# Patient Record
Sex: MALE | Race: BLACK OR AFRICAN AMERICAN | NOT HISPANIC OR LATINO | Employment: UNEMPLOYED | URBAN - METROPOLITAN AREA
[De-identification: names, ages, dates, MRNs, and addresses within clinical notes are randomized per-mention and may not be internally consistent; named-entity substitution may affect disease eponyms.]

---

## 2018-08-14 ENCOUNTER — OFFICE VISIT (OUTPATIENT)
Dept: FAMILY MEDICINE | Facility: CLINIC | Age: 43
End: 2018-08-14
Payer: COMMERCIAL

## 2018-08-14 VITALS
OXYGEN SATURATION: 98 % | WEIGHT: 265.4 LBS | DIASTOLIC BLOOD PRESSURE: 87 MMHG | SYSTOLIC BLOOD PRESSURE: 135 MMHG | TEMPERATURE: 98.5 F | BODY MASS INDEX: 36.76 KG/M2 | HEART RATE: 75 BPM | RESPIRATION RATE: 16 BRPM

## 2018-08-14 DIAGNOSIS — J02.9 VIRAL PHARYNGITIS: Primary | ICD-10-CM

## 2018-08-14 NOTE — PROGRESS NOTES
Preceptor attestation:  Vital signs reviewed: /87  Pulse 75  Temp 98.5  F (36.9  C) (Oral)  Resp 16  Wt 265 lb 6.4 oz (120.4 kg)  SpO2 98%  BMI 36.76 kg/m2    Patient seen, evaluated, and discussed with the resident.  I have verified the content of the note, which accurately reflects my assessment of the patient and the plan of care.    Supervising physician: Pamella Segovia MD  [facility]

## 2018-08-14 NOTE — PATIENT INSTRUCTIONS
To do list:  1) Use ibuprofen or tylenol as needed for discomfort    I expect that this will get better with time. If not improved within 2 weeks to 1 month, I would like you to come back to the clinic for recheck

## 2018-08-14 NOTE — PROGRESS NOTES
SUBJECTIVE       Carlito Dorantes is a 43 year old  male with a PMH significant for:     Patient Active Problem List   Diagnosis     Daytime somnolence     He presents with throat soreness for 1 month. Notices it the most with cough or sneeze. Has had quite a bit of sneezing over this period of time as well. Minimal cough. No throat itchiness, nose itchiness, eye itchiness. No nasal congestion. Denies fever/chills/sweats, abdominal pain, N/V or diarrhea. No rash on the body or itching skin. No history of allergies. No trouble swallowing.     PMH, Medications and Allergies were reviewed and updated as needed.        REVIEW OF SYSTEMS     See HPI        OBJECTIVE     Vitals:    08/14/18 1047 08/14/18 1049   BP: 142/87 135/87   BP Location: Left arm    Patient Position: Sitting    Cuff Size: Adult Large    Pulse: 75    Resp: 16    Temp: 98.5  F (36.9  C)    TempSrc: Oral    SpO2: 98%    Weight: 265 lb 6.4 oz (120.4 kg)      Body mass index is 36.76 kg/(m^2).    General appearance: Well-appearing adult male.  Alert, oriented and appropriate.  No acute distress  HEENT: Normocephalic, atraumatic.  No conjunctival injection or discharge, EOMI, PERRLA.  TMs appear normal bilaterally, no effusion present.  Nasal mucosa is normal without nasal discharge.  There is mild bilateral posterior oropharyngeal erythema.  There is bilateral tonsillar enlargement without exudate.  Neck: Supple.  Bilateral, tender submandibular and anterior cervical lymphadenopathy  Cardia vascular: Regular rate and rhythm, no rubs, murmurs or extra heart sounds  Pulmonary: Clear to auscultation bilaterally.  No wheezes, rales or rhonchi  No results found for this or any previous visit (from the past 24 hour(s)).        ASSESSMENT AND PLAN     1. Viral pharyngitis  Given the patient's history and exam, I suspect that his symptoms are secondary to a viral pharyngitis such as mononucleosis.  He has no other ongoing symptoms of URI, pneumonia or  seasonal allergies.  He has no swallowing difficulty.  He did have a negative HIV test while incarcerated recently.  He denies high-risk behaviors, and I do not feel that repeat HIV test is indicated today. Recommend ibuprofen or Tylenol as needed for discomfort.  Also encouraged plenty of rest in hydration.  Discussed that if symptoms are not improved/resolved within 2 weeks to 1 month, I would recommend that he return to clinic for a recheck.    Stefanie Allan

## 2018-08-14 NOTE — MR AVS SNAPSHOT
After Visit Summary   8/14/2018    Carlito Dorantes    MRN: [id number]           Patient Information     Date Of Birth          1975        Visit Information        Provider Department      8/14/2018 10:20 AM Stefanie Allan MD [facility]        Today's Diagnoses     Viral pharyngitis    -  1      Care Instructions    To do list:  1) Use ibuprofen or tylenol as needed for discomfort    I expect that this will get better with time. If not improved within 2 weeks to 1 month, I would like you to come back to the clinic for recheck          Follow-ups after your visit        Who to contact     Please call your clinic at [phone] to:    Ask questions about your health    Make or cancel appointments    Discuss your medicines    Learn about your test results    Speak to your doctor            Additional Information About Your Visit        Care EveryWhere ID     This is your Care EveryWhere ID. This could be used by other organizations to access your [place] medical records  [id number]        Your Vitals Were     Pulse Temperature Respirations Pulse Oximetry BMI (Body Mass Index)       75 98.5  F (36.9  C) (Oral) 16 98% 36.76 kg/m2        Blood Pressure from Last 3 Encounters:   08/14/18 135/87   12/07/15 139/85   11/16/15 155/77    Weight from Last 3 Encounters:   08/14/18 265 lb 6.4 oz (120.4 kg)   12/07/15 279 lb 9.6 oz (126.8 kg)   11/16/15 276 lb (125.2 kg)              Today, you had the following     No orders found for display         Today's Medication Changes          These changes are accurate as of 8/14/18 11:13 AM.  If you have any questions, ask your nurse or doctor.               These medicines have changed or have updated prescriptions.        Dose/Directions    amLODIPine 5 MG tablet   Commonly known as:  NORVASC   This may have changed:  Another medication with the same name was removed. Continue taking this medication, and follow the directions you see here.   Used for:   Essential hypertension   Changed by:  Stefanie Allan MD        Dose:  5 mg   Take 1 tablet (5 mg) by mouth daily   Quantity:  90 tablet   Refills:  3       vitamin D 2000 units tablet   This may have changed:  Another medication with the same name was removed. Continue taking this medication, and follow the directions you see here.   Used for:  Vitamin D deficiency   Changed by:  Stefanie Allan MD        Dose:  2000 Units   Take 2,000 Units by mouth daily   Quantity:  100 tablet   Refills:  3                Primary Care Provider Office Phone # Fax #    Lynette Nan Reyes [phone]       [address] [facility] [address]        Equal Access to Services     JOSE [facility] HospitalSIVAKUMAR : Hadii linwood beckero [place], waaxda luqadaha, qaybta kaalmada ademarcelo, ryne granados. So [facility] [phone].    ATENCIÓN: Si habla español, tiene a pizano disposición servicios gratuitos de asistencia lingüística. Llame al [phone].    We comply with applicable federal civil rights laws and [place] laws. We do not discriminate on the basis of race, color, national origin, age, disability, sex, sexual orientation, or gender identity.            Thank you!     Thank you for choosing [facility]  for your care. Our goal is always to provide you with excellent care. Hearing back from our patients is one way we can continue to improve our services. Please take a few minutes to complete the written survey that you may receive in the mail after your visit with us. Thank you!             Your Updated Medication List - Protect others around you: Learn how to safely use, store and throw away your medicines at www.disposemymeds.org.          This list is accurate as of 8/14/18 11:13 AM.  Always use your most recent med list.                   Brand Name Dispense Instructions for use Diagnosis    Adult Blood Pressure Cuff Lg Kit     1 kit    Blood pressure monitoring    Essential hypertension        amLODIPine 5 MG tablet    NORVASC    90 tablet    Take 1 tablet (5 mg) by mouth daily    Essential hypertension       lisinopril-hydrochlorothiazide 10-12.5 MG per tablet    PRINZIDE/ZESTORETIC    30 tablet    Take 1 tablet by mouth daily    Benign essential hypertension       multivitamin, therapeutic with minerals Tabs tablet      Take 1 tablet by mouth daily        simvastatin 20 MG tablet    ZOCOR    90 tablet    Take 1 tablet (20 mg) by mouth At Bedtime    Pure hypercholesterolemia       UNKNOWN TO PATIENT      Takes a blue blood pressure medication but uncertain what it is        vitamin D 2000 units tablet     100 tablet    Take 2,000 Units by mouth daily    Vitamin D deficiency

## 2018-09-20 ENCOUNTER — TELEPHONE (OUTPATIENT)
Dept: FAMILY MEDICINE | Facility: CLINIC | Age: 43
End: 2018-09-20

## 2018-09-20 ENCOUNTER — OFFICE VISIT (OUTPATIENT)
Dept: FAMILY MEDICINE | Facility: CLINIC | Age: 43
End: 2018-09-20
Payer: COMMERCIAL

## 2018-09-20 VITALS
RESPIRATION RATE: 24 BRPM | BODY MASS INDEX: 36.78 KG/M2 | DIASTOLIC BLOOD PRESSURE: 85 MMHG | WEIGHT: 265.6 LBS | TEMPERATURE: 97.8 F | SYSTOLIC BLOOD PRESSURE: 145 MMHG | HEART RATE: 79 BPM | OXYGEN SATURATION: 98 %

## 2018-09-20 DIAGNOSIS — E11.65 TYPE 2 DIABETES MELLITUS WITH HYPERGLYCEMIA, WITHOUT LONG-TERM CURRENT USE OF INSULIN (H): Primary | ICD-10-CM

## 2018-09-20 DIAGNOSIS — R10.9 FLANK PAIN: ICD-10-CM

## 2018-09-20 DIAGNOSIS — Z00.00 ROUTINE GENERAL MEDICAL EXAMINATION AT A HEALTH CARE FACILITY: ICD-10-CM

## 2018-09-20 DIAGNOSIS — L30.9 DERMATITIS: ICD-10-CM

## 2018-09-20 DIAGNOSIS — I10 BENIGN ESSENTIAL HYPERTENSION: ICD-10-CM

## 2018-09-20 LAB
BACTERIA: NORMAL
BILIRUBIN UR: ABNORMAL
BLOOD UR: NEGATIVE
BUN SERPL-MCNC: 14.7 MG/DL (ref 7–21)
CALCIUM SERPL-MCNC: 8.9 MG/DL (ref 8.5–10.1)
CASTS: NORMAL /LPF
CHLORIDE SERPLBLD-SCNC: 103.9 MMOL/L (ref 98–110)
CHOLEST SERPL-MCNC: 132.2 MG/DL (ref 0–200)
CHOLEST/HDLC SERPL: 4.1 {RATIO} (ref 0–5)
CO2 SERPL-SCNC: 24.6 MMOL/L (ref 20–32)
CREAT SERPL-MCNC: 0.9 MG/DL (ref 0.7–1.3)
CREAT UR-MCNC: 488.1 MG/DL
CRYSTAL URINE: NORMAL /LPF
EPITHELIAL CELLS UR: <2 /LPF (ref 0–2)
GFR SERPL CREATININE-BSD FRML MDRD: >90 ML/MIN/1.7 M2
GLUCOSE SERPL-MCNC: 100.5 MG'DL (ref 70–99)
GLUCOSE URINE: NEGATIVE
HBA1C MFR BLD: 5.1 % (ref 4.1–5.7)
HDLC SERPL-MCNC: 32 MG/DL
HIV 1+2 AB+HIV1 P24 AG SERPL QL IA: NEGATIVE
KETONES UR QL: NEGATIVE
LDLC SERPL CALC-MCNC: 78 MG/DL (ref 0–129)
LEUKOCYTE ESTERASE UR: ABNORMAL
MICROALBUMIN UR-MCNC: 2.03 MG/DL (ref 0–1.99)
MICROALBUMIN/CREAT UR: 4.2 MG/G
MUCOUS URINE: NORMAL LPF
NITRITE UR QL STRIP: NEGATIVE
PH UR STRIP: 5.5 [PH] (ref 5–7)
POTASSIUM SERPL-SCNC: 4.1 MMOL/DL (ref 3.2–4.6)
PROTEIN UR: ABNORMAL
RBC URINE: NORMAL /HPF
SODIUM SERPL-SCNC: 139.6 MMOL/L (ref 132–142)
SP GR UR STRIP: >=1.03
TRIGL SERPL-MCNC: 112.4 MG/DL (ref 0–150)
UROBILINOGEN UR STRIP-ACNC: ABNORMAL
VLDL CHOLESTEROL: 22.5 MG/DL (ref 7–32)
WBC URINE: NORMAL /HPF

## 2018-09-20 RX ORDER — CAPSAICIN 0.025 %
CREAM (GRAM) TOPICAL
Qty: 45 G | Refills: 1 | Status: SHIPPED | OUTPATIENT
Start: 2018-09-20 | End: 2023-10-23

## 2018-09-20 RX ORDER — NAPROXEN 500 MG/1
500 TABLET ORAL 2 TIMES DAILY PRN
Qty: 30 TABLET | Refills: 1 | Status: SHIPPED | OUTPATIENT
Start: 2018-09-20 | End: 2018-11-06

## 2018-09-20 RX ORDER — METFORMIN HCL 500 MG
500 TABLET, EXTENDED RELEASE 24 HR ORAL 2 TIMES DAILY
COMMUNITY
End: 2024-08-23

## 2018-09-20 RX ORDER — DESONIDE 0.5 MG/G
CREAM TOPICAL 2 TIMES DAILY
Qty: 15 G | Refills: 0 | Status: SHIPPED | OUTPATIENT
Start: 2018-09-20 | End: 2018-09-27

## 2018-09-20 NOTE — PROGRESS NOTES
This is a 43-year-old male not previously known to me.  He attends today with a 1 day history of discomfort in the right flank area.  He reports that the day previously he been helping his landlord put in a new door and so could have strained himself during that activity.  He otherwise is not working and does not do activities that physically exert him.  He denies any urinary frequency or dysuria.  He does go on to tell me that he did have urinary frequency when he was diagnosed with diabetes.  Indeed this is not included on his problem list and he has not had this addressed at a prior clinic visit.  With his permission care everywhere reveals that he was diagnosed with this in the ER approximately 9 months ago.  He tells me he has been going to [facility] clinic to  a prescription for metformin every month.  He has not had any blood work performed there and apparently has not seen providers there.  He attends [facility] pharmacy for his other medications.  He does not know why he is going to the [place] pharmacy just for Metformin.    He divulges that he was incarcerated for 2 years and was released a few months ago.  He knows that his HIV status was negative when in [facility].    He is attempting to adhere to both a diabetic and low-salt diet.  He finds difficulty losing weight.    Objective:  /85 (BP Location: Left arm, Patient Position: Sitting, Cuff Size: Adult Large)  Pulse 79  Temp 97.8  F (36.6  C) (Oral)  Resp 24  Wt 265 lb 9.6 oz (120.5 kg)  SpO2 98%  BMI 36.78 kg/m2  His systolic BP is elevated, he is obese.  He has no renal angle tenderness but instead describes discomfort that radiates along the level of the right 11th and 12th ribs to the mid axillary line.  There is no bony tenderness to palpation.  In addition he asks for an opinion concerning a skin lesion just lateral to approximately L1 on the left side.  It is not itchy and he thinks it has been there for a few months.  He is tried  moisturizer without effect.  On exam there is a collection of tiny skin lesions of uncertain etiology.    Carlito was seen today for pain, mass and medication reconciliation.    Diagnoses and all orders for this visit:    Type 2 diabetes mellitus with hyperglycemia, without long-term current use of insulin (H)  -     Microalbumin Creatinine Ratio Random Ur ([facility])  -     Basic Metabolic Panel ([place])  -     Hemoglobin A1c ([facility])  -     Lipid Panel ([facility])    Benign essential hypertension    Flank pain  -     Urinalysis, Micro If ([facility])  -     naproxen (NAPROSYN) 500 MG tablet; Take 1 tablet (500 mg) by mouth 2 times daily as needed for moderate pain  -     Discontinue: diclofenac (VOLTAREN) 1 % GEL topical gel; Apply  2 grams to right flank four times daily using enclosed dosing card.  -     Urine Microscopic (P )  -     capsaicin (ZOSTRIX) 0.025 % CREA cream; May apply up to 3 times daily to muscle strain  -     Urine Culture ([facility])    Routine general medical examination at a health care facility  -     HIV Ag/Ab Screen [place] ([facility])    Dermatitis  -     desonide (DESOWEN) 0.05 % cream; Apply topically 2 times daily for 7 days      I recommended that we check labs to assess his diabetic control.  I have added this to our problem list and will continue to track this.  I have also encouraged him to use a single pharmacy to reduce the risk of error.  He cannot tell me the dosing of his metformin and this needs to be updated at a future visit.    Otherwise by the time of dictation his urinalysis is come back not as clean as one would expect.  We will therefore check a urine culture and notify him of the results.  If urine culture is negative his current symptoms may reflect a muscle strain.  I given him both naproxen as well as topical capsaicin cream for symptomatic relief.    Concerning the atypical skin lesion on his back I have suggested trying a topical steroid for about 7 days.  If  this does not work I have asked him to return.  I would consider cryotherapy given that these may reflect molluscum or some other benign skin condition.  There is no features to suggest a more concerning etiology.    Total visit time was 25 mins, all of which was face to face MD time, and over 50% of this time was spent in counseling and coordination of care.

## 2018-09-20 NOTE — MR AVS SNAPSHOT
After Visit Summary   9/20/2018    Carlito Dorantes    MRN: [id number]           Patient Information     Date Of Birth          1975        Visit Information        Provider Department      9/20/2018 9:00 AM Orlando Esquivel MD [facility]        Today's Diagnoses     Type 2 diabetes mellitus with hyperglycemia, without long-term current use of insulin (H)    -  1    Benign essential hypertension        Flank pain        Routine general medical examination at a health care facility        Dermatitis           Follow-ups after your visit        Follow-up notes from your care team     Return in about 3 months (around 12/20/2018), or if symptoms worsen or fail to improve.      Who to contact     Please call your clinic at [phone] to:    Ask questions about your health    Make or cancel appointments    Discuss your medicines    Learn about your test results    Speak to your doctor            Additional Information About Your Visit        Care EveryWhere ID     This is your Care EveryWhere ID. This could be used by other organizations to access your [place] medical records  [id number]        Your Vitals Were     Pulse Temperature Respirations Pulse Oximetry BMI (Body Mass Index)       79 97.8  F (36.6  C) (Oral) 24 98% 36.78 kg/m2        Blood Pressure from Last 3 Encounters:   09/20/18 145/85   08/14/18 135/87   12/07/15 139/85    Weight from Last 3 Encounters:   09/20/18 265 lb 9.6 oz (120.5 kg)   08/14/18 265 lb 6.4 oz (120.4 kg)   12/07/15 279 lb 9.6 oz (126.8 kg)              We Performed the Following     Basic Metabolic Panel ([place])     Hemoglobin A1c ([facility])     HIV Ag/Ab Screen [place] ([facility])     Lipid Panel ([facility])     Microalbumin Creatinine Ratio Random Ur ([facility])     Urinalysis, Micro If ([facility])          Today's Medication Changes          These changes are accurate as of 9/20/18 10:03 AM.  If you have any questions, ask your nurse or doctor.               Start  taking these medicines.        Dose/Directions    desonide 0.05 % cream   Commonly known as:  DESOWEN   Used for:  Dermatitis   Started by:  Orlando Esquivel MD        Apply topically 2 times daily for 7 days   Quantity:  15 g   Refills:  0       diclofenac 1 % Gel topical gel   Commonly known as:  VOLTAREN   Used for:  Flank pain   Started by:  Orlando Esquivel MD        Apply  2 grams to right flank four times daily using enclosed dosing card.   Quantity:  100 g   Refills:  1       naproxen 500 MG tablet   Commonly known as:  NAPROSYN   Used for:  Flank pain   Started by:  Orlando Esquivel MD        Dose:  500 mg   Take 1 tablet (500 mg) by mouth 2 times daily as needed for moderate pain   Quantity:  30 tablet   Refills:  1            Where to get your medicines      These medications were sent to [facility] - [place], [place] - [address], [place] [address]     Phone:  [phone]     desonide 0.05 % cream    diclofenac 1 % Gel topical gel    naproxen 500 MG tablet                Primary Care Provider Office Phone # Fax #    Lynette Nan Reyes [phone]       [address] [place] [address]        Equal Access to Services     [facility] campusSIVAKUMAR : Hadii linwood hratmann hadasho [place], waaxda luqadaha, qaybta kaalmada adeegyada, ryne skelton . So [facility] [phone].    ATENCIÓN: Si habla español, tiene a pizano disposición servicios gratuitos de asistencia lingüística. LlSumma Health [phone].    We comply with applicable federal civil rights laws and [place] laws. We do not discriminate on the basis of race, color, national origin, age, disability, sex, sexual orientation, or gender identity.            Thank you!     Thank you for choosing [facility]  for your care. Our goal is always to provide you with excellent care. Hearing back from our patients is one way we can continue to improve our services. Please take a few minutes to complete the  written survey that you may receive in the mail after your visit with us. Thank you!             Your Updated Medication List - Protect others around you: Learn how to safely use, store and throw away your medicines at www.disposemymeds.org.          This list is accurate as of 9/20/18 10:03 AM.  Always use your most recent med list.                   Brand Name Dispense Instructions for use Diagnosis    Adult Blood Pressure Cuff Lg Kit     1 kit    Blood pressure monitoring    Essential hypertension       amLODIPine 5 MG tablet    NORVASC    90 tablet    Take 1 tablet (5 mg) by mouth daily    Essential hypertension       desonide 0.05 % cream    DESOWEN    15 g    Apply topically 2 times daily for 7 days    Dermatitis       diclofenac 1 % Gel topical gel    VOLTAREN    100 g    Apply  2 grams to right flank four times daily using enclosed dosing card.    Flank pain       lisinopril-hydrochlorothiazide 10-12.5 MG per tablet    PRINZIDE/ZESTORETIC    30 tablet    Take 1 tablet by mouth daily    Benign essential hypertension       multivitamin, therapeutic with minerals Tabs tablet      Take 1 tablet by mouth daily        naproxen 500 MG tablet    NAPROSYN    30 tablet    Take 1 tablet (500 mg) by mouth 2 times daily as needed for moderate pain    Flank pain       simvastatin 20 MG tablet    ZOCOR    90 tablet    Take 1 tablet (20 mg) by mouth At Bedtime    Pure hypercholesterolemia       UNKNOWN TO PATIENT      Takes a blue blood pressure medication but uncertain what it is        vitamin D 2000 units tablet     100 tablet    Take 2,000 Units by mouth daily    Vitamin D deficiency

## 2018-09-20 NOTE — LETTER
September 24, 2018      Carlito Dorantes  [address] [place] [address]        Dear Mikael Esteban - I hope you are feeling better by the time you receive this.  There is no infection in your urine so I think the most likely cause of your pain is a muscle strain - do please follow up if it's not getting better or if it's getting worse.     Your sugar is now back to the normal range!  This is quite a dramatic change from the very high sugars I saw when you were in the ER.  If you are eating better, it is possible that you could cut back on the dose of metformin - I think you can discuss this with your doctor at next visit in clinic.  Also your kidneys are working well and your bad cholesterol LDL level is low - which is good.   Please see below for your test results.    Resulted Orders   Microalbumin Creatinine Ratio Random Ur ([facility])   Result Value Ref Range    Microalbumin, Urine 2.03 (H) 0.00 - 1.99 mg/dL    Creatinine, Urine 488.1 mg/dL    Albumin Urine mg/g Cr 4.2 <=19.9 mg/g    Narrative    Test performed by:  [facility]  [address] [place] [address]  Microalbumin, Random Urine  <2.0 mg/dL . . . . . . . . Normal  3.0-30.0 mg/dL . . . . . . Microalbuminuria  >30.0 mg/dL . . . . . .  . Clinical Proteinuria  Microalbumin/Creatinine Ratio, Random Urine  <20 mg/g . . . . .. . . . Normal   mg/g . . . . . . . Microalbuminuria  >300 mg/g . . . . . . . . Clinical Proteinuria   Urinalysis, Micro If ([facility])   Result Value Ref Range    Specific Gravity Urine >=1.030 1.005 - 1.030    pH Urine 5.5 4.5 - 8.0    Leukocyte Esterase UR Trace (A) NEGATIVE    Nitrite Urine Negative NEGATIVE    Protein UR 1+ (A) NEGATIVE    Glucose Urine Negative NEGATIVE    Ketones Urine Negative NEGATIVE    Urobilinogen mg/dL 1.0 E.U./dL (A) 0.2 E.U./dL    Bilirubin UR 1+ (A) NEGATIVE    Blood UR Negative NEGATIVE   Basic Metabolic Panel ([place])   Result Value Ref Range    Urea Nitrogen 14.7  7.0 - 21.0 mg/dL    Calcium 8.9 8.5 - 10.1 mg/dL    Chloride 103.9 98.0 - 110.0 mmol/L    Carbon Dioxide 24.6 20.0 - 32.0 mmol/L    Creatinine 0.9 0.7 - 1.3 mg/dL    Glucose 100.5 (H) 70.0 - 99.0 mg'dL    Potassium 4.1 3.2 - 4.6 mmol/dL    Sodium 139.6 132.0 - 142.0 mmol/L    GFR Estimate >90 >60.0 mL/min/1.7 m2    GFR Estimate If Black >90 >60.0 mL/min/1.7 m2   Hemoglobin A1c ([facility])   Result Value Ref Range    Hemoglobin A1C 5.1 4.1 - 5.7 %   Lipid Panel ([facility])   Result Value Ref Range    Cholesterol 132.2 0.0 - 200.0 mg/dL    Cholesterol/HDL Ratio 4.1 0.0 - 5.0    HDL Cholesterol 32.0 (L) >40.0 mg/dL    LDL Cholesterol Calculated 78 0 - 129 mg/dL    Triglycerides 112.4 0.0 - 150.0 mg/dL    VLDL Cholesterol 22.5 7.0 - 32.0 mg/dL   HIV Ag/Ab Screen [place] ([facility])   Result Value Ref Range    HIV Antigen/Antibody Negative Negative    Narrative    Test performed by:  [facility] [address] [place] [address]  Method is Abbott HIV Ag/Ab for the detection of HIV p24 antigen, HIV-1   antibodies and HIV-2 antibodies.   Urine Microscopic ([facility])   Result Value Ref Range    WBC Urine 2-5 <5 /hpf    RBC Urine None <5 /hpf    Epithelial Cells UR <2 0 - 2 /lpf    Mucous Urine Few NONE lpf    Casts Urine None NONE /lpf    Crystal Urine None NONE /lpf    Bacteria Wet Prep Few None   Urine Culture ([facility])   Result Value Ref Range    Culture SEE RESULTS BELOW       Comment:      CULTURE, URINE   SOURCE: Urine, Random   CULTURE RESULTS:    No Growth      Narrative    Test performed by:  [facility] [address] [place] [address]       If you have any questions, please call the clinic to make an appointment.    Sincerely,    Orlando Esquivel MD

## 2018-09-20 NOTE — TELEPHONE ENCOUNTER
Medication change request.     Please advise on change, see pharmacy note:   Diclofenac Sodium 1% gel not covered. Please send for Capsaicin 0.025%.     Neli Logan, CMA

## 2018-09-21 LAB — CULTURE: NORMAL

## 2018-09-21 NOTE — PROGRESS NOTES
Mikael Dorantes - I hope you are feeling better by the time you receive this.  There is no infection in your urine so I think the most likely cause of your pain is a muscle strain - do please follow up if it's not getting better or if it's getting worse.    Your sugar is now back to the normal range!  This is quite a dramatic change from the very high sugars I saw when you were in the ER.  If you are eating better, it is possible that you could cut back on the dose of metformin - I think you can discuss this with your doctor at next visit in clinic.  Also your kidneys are working well and your bad cholesterol LDL level is low - which is good.     Take care, Dr Orlando Esquivel

## 2018-10-30 ENCOUNTER — TELEPHONE (OUTPATIENT)
Dept: FAMILY MEDICINE | Facility: CLINIC | Age: 43
End: 2018-10-30

## 2018-10-30 NOTE — TELEPHONE ENCOUNTER
[facility] phone call message- general phone call:    Reason for call: His sleep apna machine is not working any more and he needs a new one.Please give a call back re getting a new prescription.        Return call needed: Yes    OK to leave a message on voice mail? Yes    Primary language: English      needed? No    Call taken on October 30, 2018 at 8:53 AM by Rolo Zeng

## 2018-10-31 ENCOUNTER — TELEPHONE (OUTPATIENT)
Dept: FAMILY MEDICINE | Facility: CLINIC | Age: 43
End: 2018-10-31

## 2018-10-31 DIAGNOSIS — G47.33 OSA (OBSTRUCTIVE SLEEP APNEA): Primary | ICD-10-CM

## 2018-10-31 NOTE — TELEPHONE ENCOUNTER
Ordered new machine. Has to be a printed DME. Have patient  from clinic and take to medical supply store - should use whatever setting were prescribed by the physician who did his sleep study. Thanks!

## 2018-10-31 NOTE — TELEPHONE ENCOUNTER
Please advise, patient call asking for replacement of sleep apnea machine current machine is not in working condition.     Neli Logan, CMA

## 2018-11-06 ENCOUNTER — OFFICE VISIT (OUTPATIENT)
Dept: FAMILY MEDICINE | Facility: CLINIC | Age: 43
End: 2018-11-06
Payer: COMMERCIAL

## 2018-11-06 VITALS
OXYGEN SATURATION: 96 % | BODY MASS INDEX: 38.22 KG/M2 | RESPIRATION RATE: 14 BRPM | TEMPERATURE: 98.4 F | DIASTOLIC BLOOD PRESSURE: 86 MMHG | HEART RATE: 79 BPM | WEIGHT: 276 LBS | SYSTOLIC BLOOD PRESSURE: 138 MMHG

## 2018-11-06 DIAGNOSIS — R10.9 FLANK PAIN: ICD-10-CM

## 2018-11-06 DIAGNOSIS — G47.33 OSA (OBSTRUCTIVE SLEEP APNEA): Primary | ICD-10-CM

## 2018-11-06 RX ORDER — NAPROXEN 500 MG/1
500 TABLET ORAL 2 TIMES DAILY PRN
Qty: 30 TABLET | Refills: 1 | Status: SHIPPED | OUTPATIENT
Start: 2018-11-06 | End: 2023-10-23

## 2018-11-06 NOTE — PROGRESS NOTES
"Mikel Dorantes is a 43 year old male with a history including hypertension, DM2, and sleep apnea who presents because his CPAP machine is broken.    It broke about 1.5 weeks ago - it shuts down after just a few seconds.  He estimates that it is about 5 years old, which is consistent with when he initially presented with daytime somnolence in August 2013.    Social: Non-smoker.    Objective   Vitals: /86  Pulse 79  Temp 98.4  F (36.9  C) (Oral)  Resp 14  Wt 276 lb (125.2 kg)  SpO2 96%  BMI 38.22 kg/m2  General: Pleasant. Middle-aged man. No distress.  HEENT: Neck circumference 17.75\".  Heart: Regular rate and rhythm. No murmurs, rubs, or gallops.  Lungs: Clear to auscultation bilaterally. No wheezes or crackles. Good air movement.    Assessment & Plan   Face-to-face visit for new CPAP machine and supplies.  -- Prescription written for new machine and supplies.    Face to Face Attestation and Initial Plan of Care     The face-to-face encounter occurred on 11/06/2018.     Face to Face encounter was with: Pamella Segovia MD.     Please provide brief clinical summary of reason for visit: Home CPAP machine broken, 5 years old.  Diagnosis of sleep apnea.    Other information to assist the home health agency in developing the initial Plan of Care:   I certify that services are/were furnished while this patient was under the care of a physician.  A plan of care has been established by a physician and is periodically reviewed by a physician. The patient requires home CPAP as outlined in the plan of care.    Return to clinic in 6 months for blood pressure follow-up.  "

## 2018-11-06 NOTE — MR AVS SNAPSHOT
After Visit Summary   11/6/2018    Carlito Dorantes    MRN: [id number]           Patient Information     Date Of Birth          1975        Visit Information        Provider Department      11/6/2018 2:30 PM Pamella Segovia MD [facility]        Today's Diagnoses     PREETI (obstructive sleep apnea)    -  1      Care Instructions    Lifestyle Modifications to Manage Hypertension    Weight reduction to a body mass index of 18.5 to 24.9 will give rise to a systolic blood pressure reduction of  5 to 20 mm Hg.    Body mass index is 38.22 kg/(m^2).    Incorporating the DASH Diet (a diet rich in fruits, vegetables, and low-fat dairy products with a reduced content of saturated and total fat) is as effective as a single drug agent.  This will provide a reduction of  8 to 14 mm Hg    Dietary sodium restriction to no more than 100 mmol per day (2.4 g sodium or 6 g sodium chloride) will provide a reduction of  2 to 8 mm Hg.    Engage in regular aerobic activity such as brisk walking (at least 30 minutes per day, most days of the week). This will provide a reduction of  4 to 9 mm Hg.    Limit alcohol consumption to no more than two drinks (1 oz or 30 mL of alcohol; e.g., 24-oz beer, 10 oz of wine, or 3 oz of 80-proof whiskey) per day in most men and to no more than one drink per day in women and lighter-weight persons. This will provide a reduction of  2 to 4 mm Hg.    If continues to be elevated, we should consider using a low dose of a medication to improve this and lower your risk for heart attack and stroke.    ======================    DASH diet: Healthy eating to lower your blood pressure    [email or web address]/healthy-lifestyle/nutrition-and-healthy-eating/in-depth/dash-diet/art-[email or web address]    The DASH diet emphasizes portion size, eating a variety of foods and getting the right amount of nutrients. Discover how DASH can improve your health and lower your blood pressure.    By [facility]  Staff  DASH stands for Dietary Approaches to Stop Hypertension. The DASH diet is a lifelong approach to healthy eating that's designed to help treat or prevent high blood pressure (hypertension). The DASH diet encourages you to reduce the sodium in your diet and eat a variety of foods rich in nutrients that help lower blood pressure, such as potassium, calcium and magnesium.    By following the DASH diet, you may be able to reduce your blood pressure by a few points in just two weeks. Over time, your systolic blood pressure could drop by eight to 14 points, which can make a significant difference in your health risks.    Because the DASH diet is a healthy way of eating, it offers health benefits besides just lowering blood pressure. The DASH diet is also in line with dietary recommendations to prevent osteoporosis, cancer, heart disease, stroke and diabetes.    DASH diet: Sodium levels  The DASH diet emphasizes vegetables, fruits and low-fat dairy foods -- and moderate amounts of whole grains, fish, poultry and nuts.    In addition to the standard DASH diet, there is also a lower sodium version of the diet. You can choose the version of the diet that meets your health needs:    Standard DASH diet. You can consume up to 2,300 milligrams (mg) of sodium a day.  Lower sodium DASH diet. You can consume up to 1,500 mg of sodium a day.  Both versions of the DASH diet aim to reduce the amount of sodium in your diet compared with what you might get in a typical American diet, which can amount to a whopping 3,400 mg of sodium a day or more.    The standard DASH diet meets the recommendation from the Dietary Guidelines for Americans to keep daily sodium intake to less than 2,300 mg a day.    The American Heart Association recommends 1,500 mg a day of sodium as an upper limit for all adults. If you aren't sure what sodium level is right for you, talk to your doctor.    DASH diet: What to eat  Both versions of the DASH diet  "include lots of whole grains, fruits, vegetables and low-fat dairy products. The DASH diet also includes some fish, poultry and legumes, and encourages a small amount of nuts and seeds a few times a week.    You can eat red meat, sweets and fats in small amounts. The DASH diet is low in saturated fat, cholesterol and total fat.    Here's a look at the recommended servings from each food group for the 2,627-hzbxcrk-b-day DASH diet.    Grains: 6 to 8 servings a day  Grains include bread, cereal, rice and pasta. Examples of one serving of grains include 1 slice whole-wheat bread, 1 ounce dry cereal, or 1/2 cup cooked cereal, rice or pasta.    Focus on whole grains because they have more fiber and nutrients than do refined grains. For instance, use brown rice instead of white rice, whole-wheat pasta instead of regular pasta and whole-grain bread instead of white bread. Look for products labeled \"100 percent whole grain\" or \"100 percent whole wheat.\"  Grains are naturally low in fat. Keep them this way by avoiding butter, cream and cheese sauces.  Vegetables: 4 to 5 servings a day  Tomatoes, carrots, broccoli, sweet potatoes, greens and other vegetables are full of fiber, vitamins, and such minerals as potassium and magnesium. Examples of one serving include 1 cup raw leafy green vegetables or 1/2 cup cut-up raw or cooked vegetables.    Don't think of vegetables only as side dishes -- a hearty blend of vegetables served over brown rice or whole-wheat noodles can serve as the main dish for a meal.  Fresh and frozen vegetables are both good choices. When buying frozen and canned vegetables, choose those labeled as low sodium or without added salt.  To increase the number of servings you fit in daily, be creative. In a stir-khoury, for instance, cut the amount of meat in half and double up on the vegetables.  Fruits: 4 to 5 servings a day  Many fruits need little preparation to become a healthy part of a meal or snack. Like " vegetables, they're packed with fiber, potassium and magnesium and are typically low in fat -- coconuts are an exception. Examples of one serving include one medium fruit, 1/2 cup fresh, frozen or canned fruit, or 4 ounces of juice.    Have a piece of fruit with meals and one as a snack, then round out your day with a dessert of fresh fruits topped with a dollop of low-fat yogurt.  Leave on edible peels whenever possible. The peels of apples, pears and most fruits with pits add interesting texture to recipes and contain healthy nutrients and fiber.  Remember that citrus fruits and juices, such as grapefruit, can interact with certain medications, so check with your doctor or pharmacist to see if they're OK for you.  If you choose canned fruit or juice, make sure no sugar is added.  Dairy: 2 to 3 servings a day  Milk, yogurt, cheese and other dairy products are major sources of calcium, vitamin D and protein. But the key is to make sure that you choose dairy products that are low fat or fat-free because otherwise they can be a major source of fat -- and most of it is saturated. Examples of one serving include 1 cup skim or 1 percent milk, 1 cup low fat yogurt, or 1 1/2 ounces part-skim cheese.    Low-fat or fat-free frozen yogurt can help you boost the amount of dairy products you eat while offering a sweet treat. Add fruit for a healthy twist.  If you have trouble digesting dairy products, choose lactose-free products or consider taking an over-the-counter product that contains the enzyme lactase, which can reduce or prevent the symptoms of lactose intolerance.  Go easy on regular and even fat-free cheeses because they are typically high in sodium.  Lean meat, poultry and fish: 6 servings or fewer a day  Meat can be a rich source of protein, B vitamins, iron and zinc. Choose lean varieties and aim for no more than 6 ounces a day. Cutting back on your meat portion will allow room for more vegetables.    Trim away  skin and fat from poultry and meat and then bake, broil, grill or roast instead of frying in fat.  Eat heart-healthy fish, such as salmon, herring and tuna. These types of fish are high in omega-3 fatty acids, which can help lower your total cholesterol.  Nuts, seeds and legumes: 4 to 5 servings a week  Almonds, sunflower seeds, kidney beans, peas, lentils and other foods in this family are good sources of magnesium, potassium and protein. They're also full of fiber and phytochemicals, which are plant compounds that may protect against some cancers and cardiovascular disease.    Serving sizes are small and are intended to be consumed only a few times a week because these foods are high in calories. Examples of one serving include 1/3 cup nuts, 2 tablespoons seeds, or 1/2 cup cooked beans or peas.    Nuts sometimes get a bad rap because of their fat content, but they contain healthy types of fat -- monounsaturated fat and omega-3 fatty acids. They're high in calories, however, so eat them in moderation. Try adding them to stir-fries, salads or cereals.  Soybean-based products, such as tofu and tempeh, can be a good alternative to meat because they contain all of the amino acids your body needs to make a complete protein, just like meat.  Fats and oils: 2 to 3 servings a day  Fat helps your body absorb essential vitamins and helps your body's immune system. But too much fat increases your risk of heart disease, diabetes and obesity. The DASH diet strives for a healthy balance by limiting total fat to less than 30 percent of daily calories from fat, with a focus on the healthier monounsaturated fats.    Examples of one serving include 1 teaspoon soft margarine, 1 tablespoon mayonnaise or 2 tablespoons salad dressing.    Saturated fat and trans fat are the main dietary culprits in increasing your risk of coronary artery disease. DASH helps keep your daily saturated fat to less than 6 percent of your total calories by  limiting use of meat, butter, cheese, whole milk, cream and eggs in your diet, along with foods made from lard, solid shortenings, and palm and coconut oils.  Avoid trans fat, commonly found in such processed foods as crackers, baked goods and fried items.  Read food labels on margarine and salad dressing so that you can choose those that are lowest in saturated fat and free of trans fat.  Sweets: 5 servings or fewer a week  You don't have to banish sweets entirely while following the DASH diet -- just go easy on them. Examples of one serving include 1 tablespoon sugar, jelly or jam, 1/2 cup sorbet, or 1 cup lemonade.    When you eat sweets, choose those that are fat-free or low-fat, such as sorbets, fruit ices, jelly beans, hard candy, yandy crackers or low-fat cookies.  Artificial sweeteners such as aspartame (NutraSweet, Equal) and sucralose (Splenda) may help satisfy your sweet tooth while sparing the sugar. But remember that you still must use them sensibly. It's OK to swap a diet cola for a regular cola, but not in place of a more nutritious beverage such as low-fat milk or even plain water.  Cut back on added sugar, which has no nutritional value but can pack on calories.  DASH diet: Alcohol and caffeine  Drinking too much alcohol can increase blood pressure. The Dietary Guidelines for Americans recommends that men limit alcohol to no more than two drinks a day and women to one or less.    The DASH diet doesn't address caffeine consumption. The influence of caffeine on blood pressure remains unclear. But caffeine can cause your blood pressure to rise at least temporarily. If you already have high blood pressure or if you think caffeine is affecting your blood pressure, talk to your doctor about your caffeine consumption.    DASH diet and weight loss  While the DASH diet is not a weight-loss program, you may indeed lose unwanted pounds because it can help guide you toward healthier food choices.    The DASH  "diet generally includes about 2,000 calories a day. If you're trying to lose weight, you may need to eat fewer calories. You may also need to adjust your serving goals based on your individual circumstances -- something your health care team can help you decide.    Tips to cut back on sodium  The foods at the core of the DASH diet are naturally low in sodium. So just by following the DASH diet, you're likely to reduce your sodium intake. You also reduce sodium further by:    Using sodium-free spices or flavorings with your food instead of salt  Not adding salt when cooking rice, pasta or hot cereal  Rinsing canned foods to remove some of the sodium  Buying foods labeled \"no salt added,\" \"sodium-free,\" \"low sodium\" or \"very low sodium\"  One teaspoon of table salt has 2,325 mg of sodium. When you read food labels, you may be surprised at just how much sodium some processed foods contain. Even low-fat soups, canned vegetables, ready-to-eat cereals and sliced turkey from the local deli -- foods you may have considered healthy -- often have lots of sodium.    You may notice a difference in taste when you choose low-sodium food and beverages. If things seem too bland, gradually introduce low-sodium foods and cut back on table salt until you reach your sodium goal. That'll give your palate time to adjust.    Using salt-free seasoning blends or herbs and spices may also ease the transition. It can take several weeks for your taste buds to get used to less salty foods.    Putting the pieces of the DASH diet together  Try these strategies to get started on the DASH diet:    Change gradually. If you now eat only one or two servings of fruits or vegetables a day, try to add a serving at lunch and one at dinner. Rather than switching to all whole grains, start by making one or two of your grain servings whole grains. Increasing fruits, vegetables and whole grains gradually can also help prevent bloating or diarrhea that may " occur if you aren't used to eating a diet with lots of fiber. You can also try over-the-counter products to help reduce gas from beans and vegetables.  Reward successes and forgive slip-ups. Reward yourself with a nonfood treat for your accomplishments -- rent a movie, purchase a book or get together with a friend. Everyone slips, especially when learning something new. Remember that changing your lifestyle is a long-term process. Find out what triggered your setback and then just  where you left off with the DASH diet.  Add physical activity. To boost your blood pressure lowering efforts even more, consider increasing your physical activity in addition to following the DASH diet. Combining both the DASH diet and physical activity makes it more likely that you'll reduce your blood pressure.  Get support if you need it. If you're having trouble sticking to your diet, talk to your doctor or dietitian about it. You might get some tips that will help you stick to the DASH diet.  Remember, healthy eating isn't an all-or-nothing proposition. What's most important is that, on average, you eat healthier foods with plenty of variety -- both to keep your diet nutritious and to avoid boredom or extremes. And with the DASH diet, you can have both.          Follow-ups after your visit        Who to contact     Please call your clinic at [phone] to:    Ask questions about your health    Make or cancel appointments    Discuss your medicines    Learn about your test results    Speak to your doctor            Additional Information About Your Visit        Care EveryWhere ID     This is your Care EveryWhere ID. This could be used by other organizations to access your [facility] medical records  [id number]        Your Vitals Were     Pulse Temperature Respirations Pulse Oximetry BMI (Body Mass Index)       79 98.4  F (36.9  C) (Oral) 14 96% 38.22 kg/m2        Blood Pressure from Last 3 Encounters:   11/06/18 138/86    09/20/18 145/85   08/14/18 135/87    Weight from Last 3 Encounters:   11/06/18 276 lb (125.2 kg)   09/20/18 265 lb 9.6 oz (120.5 kg)   08/14/18 265 lb 6.4 oz (120.4 kg)              Today, you had the following     No orders found for display         Today's Medication Changes          These changes are accurate as of 11/6/18  3:00 PM.  If you have any questions, ask your nurse or doctor.               Start taking these medicines.        Dose/Directions    order for DME   Used for:  PREETI (obstructive sleep apnea)   Started by:  Pamella Segovia MD        Equipment being ordered: CPAP machine and supplies   Quantity:  1 each   Refills:  0            Where to get your medicines      These medications were sent to [facility] - [place], [place] - [address], [place] [address]     Phone:  [phone]     naproxen 500 MG tablet         Some of these will need a paper prescription and others can be bought over the counter.  Ask your nurse if you have questions.     Bring a paper prescription for each of these medications     order for DME                Primary Care Provider Office Phone # Fax #    Lynette Nan Reyes [phone]       [address] [facility] [address]        Equal Access to Services     LUCA LLOYD AH: Austin beckero Soomaali, waaxda luqadaha, qaybta kaalmada adeegyada, ryne granados. So [facility] [phone].    ATENCIÓN: Si habla español, tiene a pizano disposición servicios gratuitos de asistencia lingüística. Llame al [phone].    We comply with applicable federal civil rights laws and [place] laws. We do not discriminate on the basis of race, color, national origin, age, disability, sex, sexual orientation, or gender identity.            Thank you!     Thank you for choosing [facility]  for your care. Our goal is always to provide you with excellent care. Hearing back from our patients is one way we can  continue to improve our services. Please take a few minutes to complete the written survey that you may receive in the mail after your visit with us. Thank you!             Your Updated Medication List - Protect others around you: Learn how to safely use, store and throw away your medicines at www.disposemymeds.org.          This list is accurate as of 11/6/18  3:00 PM.  Always use your most recent med list.                   Brand Name Dispense Instructions for use Diagnosis    Adult Blood Pressure Cuff Lg Kit     1 kit    Blood pressure monitoring    Essential hypertension       amLODIPine 5 MG tablet    NORVASC    90 tablet    Take 1 tablet (5 mg) by mouth daily    Essential hypertension       capsaicin 0.025 % Crea cream    ZOSTRIX    45 g    May apply up to 3 times daily to muscle strain    Flank pain       lisinopril-hydrochlorothiazide 10-12.5 MG per tablet    PRINZIDE/ZESTORETIC    30 tablet    Take 1 tablet by mouth daily    Benign essential hypertension       metFORMIN 500 MG 24 hr tablet    GLUCOPHAGE-XR     Take 500 mg by mouth 2 times daily        multivitamin, therapeutic with minerals Tabs tablet      Take 1 tablet by mouth daily        naproxen 500 MG tablet    NAPROSYN    30 tablet    Take 1 tablet (500 mg) by mouth 2 times daily as needed for moderate pain    Flank pain       order for DME     1 Units    Equipment being ordered: CPAP    PREETI (obstructive sleep apnea)       order for DME     1 each    Equipment being ordered: CPAP machine and supplies    PREETI (obstructive sleep apnea)       simvastatin 20 MG tablet    ZOCOR    90 tablet    Take 1 tablet (20 mg) by mouth At Bedtime    Pure hypercholesterolemia       vitamin D 2000 units tablet     100 tablet    Take 2,000 Units by mouth daily    Vitamin D deficiency

## 2018-11-06 NOTE — PATIENT INSTRUCTIONS
Lifestyle Modifications to Manage Hypertension    Weight reduction to a body mass index of 18.5 to 24.9 will give rise to a systolic blood pressure reduction of  5 to 20 mm Hg.    Body mass index is 38.22 kg/(m^2).    Incorporating the DASH Diet (a diet rich in fruits, vegetables, and low-fat dairy products with a reduced content of saturated and total fat) is as effective as a single drug agent.  This will provide a reduction of  8 to 14 mm Hg    Dietary sodium restriction to no more than 100 mmol per day (2.4 g sodium or 6 g sodium chloride) will provide a reduction of  2 to 8 mm Hg.    Engage in regular aerobic activity such as brisk walking (at least 30 minutes per day, most days of the week). This will provide a reduction of  4 to 9 mm Hg.    Limit alcohol consumption to no more than two drinks (1 oz or 30 mL of alcohol; e.g., 24-oz beer, 10 oz of wine, or 3 oz of 80-proof whiskey) per day in most men and to no more than one drink per day in women and lighter-weight persons. This will provide a reduction of  2 to 4 mm Hg.    If continues to be elevated, we should consider using a low dose of a medication to improve this and lower your risk for heart attack and stroke.    ======================    DASH diet: Healthy eating to lower your blood pressure    [email or web address]/healthy-lifestyle/nutrition-and-healthy-eating/in-depth/dash-diet/art-12647470    The DASH diet emphasizes portion size, eating a variety of foods and getting the right amount of nutrients. Discover how DASH can improve your health and lower your blood pressure.    By [facility] Staff  DASH stands for Dietary Approaches to Stop Hypertension. The DASH diet is a lifelong approach to healthy eating that's designed to help treat or prevent high blood pressure (hypertension). The DASH diet encourages you to reduce the sodium in your diet and eat a variety of foods rich in nutrients that help lower blood pressure, such as potassium, calcium  and magnesium.    By following the DASH diet, you may be able to reduce your blood pressure by a few points in just two weeks. Over time, your systolic blood pressure could drop by eight to 14 points, which can make a significant difference in your health risks.    Because the DASH diet is a healthy way of eating, it offers health benefits besides just lowering blood pressure. The DASH diet is also in line with dietary recommendations to prevent osteoporosis, cancer, heart disease, stroke and diabetes.    DASH diet: Sodium levels  The DASH diet emphasizes vegetables, fruits and low-fat dairy foods -- and moderate amounts of whole grains, fish, poultry and nuts.    In addition to the standard DASH diet, there is also a lower sodium version of the diet. You can choose the version of the diet that meets your health needs:    Standard DASH diet. You can consume up to 2,300 milligrams (mg) of sodium a day.  Lower sodium DASH diet. You can consume up to 1,500 mg of sodium a day.  Both versions of the DASH diet aim to reduce the amount of sodium in your diet compared with what you might get in a typical American diet, which can amount to a whopping 3,400 mg of sodium a day or more.    The standard DASH diet meets the recommendation from the Dietary Guidelines for Americans to keep daily sodium intake to less than 2,300 mg a day.    The American Heart Association recommends 1,500 mg a day of sodium as an upper limit for all adults. If you aren't sure what sodium level is right for you, talk to your doctor.    DASH diet: What to eat  Both versions of the DASH diet include lots of whole grains, fruits, vegetables and low-fat dairy products. The DASH diet also includes some fish, poultry and legumes, and encourages a small amount of nuts and seeds a few times a week.    You can eat red meat, sweets and fats in small amounts. The DASH diet is low in saturated fat, cholesterol and total fat.    Here's a look at the recommended  "servings from each food group for the 2,830-ifgakth-k-day DASH diet.    Grains: 6 to 8 servings a day  Grains include bread, cereal, rice and pasta. Examples of one serving of grains include 1 slice whole-wheat bread, 1 ounce dry cereal, or 1/2 cup cooked cereal, rice or pasta.    Focus on whole grains because they have more fiber and nutrients than do refined grains. For instance, use brown rice instead of white rice, whole-wheat pasta instead of regular pasta and whole-grain bread instead of white bread. Look for products labeled \"100 percent whole grain\" or \"100 percent whole wheat.\"  Grains are naturally low in fat. Keep them this way by avoiding butter, cream and cheese sauces.  Vegetables: 4 to 5 servings a day  Tomatoes, carrots, broccoli, sweet potatoes, greens and other vegetables are full of fiber, vitamins, and such minerals as potassium and magnesium. Examples of one serving include 1 cup raw leafy green vegetables or 1/2 cup cut-up raw or cooked vegetables.    Don't think of vegetables only as side dishes -- a hearty blend of vegetables served over brown rice or whole-wheat noodles can serve as the main dish for a meal.  Fresh and frozen vegetables are both good choices. When buying frozen and canned vegetables, choose those labeled as low sodium or without added salt.  To increase the number of servings you fit in daily, be creative. In a stir-khoury, for instance, cut the amount of meat in half and double up on the vegetables.  Fruits: 4 to 5 servings a day  Many fruits need little preparation to become a healthy part of a meal or snack. Like vegetables, they're packed with fiber, potassium and magnesium and are typically low in fat -- coconuts are an exception. Examples of one serving include one medium fruit, 1/2 cup fresh, frozen or canned fruit, or 4 ounces of juice.    Have a piece of fruit with meals and one as a snack, then round out your day with a dessert of fresh fruits topped with a dollop of " low-fat yogurt.  Leave on edible peels whenever possible. The peels of apples, pears and most fruits with pits add interesting texture to recipes and contain healthy nutrients and fiber.  Remember that citrus fruits and juices, such as grapefruit, can interact with certain medications, so check with your doctor or pharmacist to see if they're OK for you.  If you choose canned fruit or juice, make sure no sugar is added.  Dairy: 2 to 3 servings a day  Milk, yogurt, cheese and other dairy products are major sources of calcium, vitamin D and protein. But the key is to make sure that you choose dairy products that are low fat or fat-free because otherwise they can be a major source of fat -- and most of it is saturated. Examples of one serving include 1 cup skim or 1 percent milk, 1 cup low fat yogurt, or 1 1/2 ounces part-skim cheese.    Low-fat or fat-free frozen yogurt can help you boost the amount of dairy products you eat while offering a sweet treat. Add fruit for a healthy twist.  If you have trouble digesting dairy products, choose lactose-free products or consider taking an over-the-counter product that contains the enzyme lactase, which can reduce or prevent the symptoms of lactose intolerance.  Go easy on regular and even fat-free cheeses because they are typically high in sodium.  Lean meat, poultry and fish: 6 servings or fewer a day  Meat can be a rich source of protein, B vitamins, iron and zinc. Choose lean varieties and aim for no more than 6 ounces a day. Cutting back on your meat portion will allow room for more vegetables.    Trim away skin and fat from poultry and meat and then bake, broil, grill or roast instead of frying in fat.  Eat heart-healthy fish, such as salmon, herring and tuna. These types of fish are high in omega-3 fatty acids, which can help lower your total cholesterol.  Nuts, seeds and legumes: 4 to 5 servings a week  Almonds, sunflower seeds, kidney beans, peas, lentils and other  foods in this family are good sources of magnesium, potassium and protein. They're also full of fiber and phytochemicals, which are plant compounds that may protect against some cancers and cardiovascular disease.    Serving sizes are small and are intended to be consumed only a few times a week because these foods are high in calories. Examples of one serving include 1/3 cup nuts, 2 tablespoons seeds, or 1/2 cup cooked beans or peas.    Nuts sometimes get a bad rap because of their fat content, but they contain healthy types of fat -- monounsaturated fat and omega-3 fatty acids. They're high in calories, however, so eat them in moderation. Try adding them to stir-fries, salads or cereals.  Soybean-based products, such as tofu and tempeh, can be a good alternative to meat because they contain all of the amino acids your body needs to make a complete protein, just like meat.  Fats and oils: 2 to 3 servings a day  Fat helps your body absorb essential vitamins and helps your body's immune system. But too much fat increases your risk of heart disease, diabetes and obesity. The DASH diet strives for a healthy balance by limiting total fat to less than 30 percent of daily calories from fat, with a focus on the healthier monounsaturated fats.    Examples of one serving include 1 teaspoon soft margarine, 1 tablespoon mayonnaise or 2 tablespoons salad dressing.    Saturated fat and trans fat are the main dietary culprits in increasing your risk of coronary artery disease. DASH helps keep your daily saturated fat to less than 6 percent of your total calories by limiting use of meat, butter, cheese, whole milk, cream and eggs in your diet, along with foods made from lard, solid shortenings, and palm and coconut oils.  Avoid trans fat, commonly found in such processed foods as crackers, baked goods and fried items.  Read food labels on margarine and salad dressing so that you can choose those that are lowest in saturated fat  and free of trans fat.  Sweets: 5 servings or fewer a week  You don't have to banish sweets entirely while following the DASH diet -- just go easy on them. Examples of one serving include 1 tablespoon sugar, jelly or jam, 1/2 cup sorbet, or 1 cup lemonade.    When you eat sweets, choose those that are fat-free or low-fat, such as sorbets, fruit ices, jelly beans, hard candy, yandy crackers or low-fat cookies.  Artificial sweeteners such as aspartame (NutraSweet, Equal) and sucralose (Splenda) may help satisfy your sweet tooth while sparing the sugar. But remember that you still must use them sensibly. It's OK to swap a diet cola for a regular cola, but not in place of a more nutritious beverage such as low-fat milk or even plain water.  Cut back on added sugar, which has no nutritional value but can pack on calories.  DASH diet: Alcohol and caffeine  Drinking too much alcohol can increase blood pressure. The Dietary Guidelines for Americans recommends that men limit alcohol to no more than two drinks a day and women to one or less.    The DASH diet doesn't address caffeine consumption. The influence of caffeine on blood pressure remains unclear. But caffeine can cause your blood pressure to rise at least temporarily. If you already have high blood pressure or if you think caffeine is affecting your blood pressure, talk to your doctor about your caffeine consumption.    DASH diet and weight loss  While the DASH diet is not a weight-loss program, you may indeed lose unwanted pounds because it can help guide you toward healthier food choices.    The DASH diet generally includes about 2,000 calories a day. If you're trying to lose weight, you may need to eat fewer calories. You may also need to adjust your serving goals based on your individual circumstances -- something your health care team can help you decide.    Tips to cut back on sodium  The foods at the core of the DASH diet are naturally low in sodium. So just  "by following the DASH diet, you're likely to reduce your sodium intake. You also reduce sodium further by:    Using sodium-free spices or flavorings with your food instead of salt  Not adding salt when cooking rice, pasta or hot cereal  Rinsing canned foods to remove some of the sodium  Buying foods labeled \"no salt added,\" \"sodium-free,\" \"low sodium\" or \"very low sodium\"  One teaspoon of table salt has 2,325 mg of sodium. When you read food labels, you may be surprised at just how much sodium some processed foods contain. Even low-fat soups, canned vegetables, ready-to-eat cereals and sliced turkey from the local deli -- foods you may have considered healthy -- often have lots of sodium.    You may notice a difference in taste when you choose low-sodium food and beverages. If things seem too bland, gradually introduce low-sodium foods and cut back on table salt until you reach your sodium goal. That'll give your palate time to adjust.    Using salt-free seasoning blends or herbs and spices may also ease the transition. It can take several weeks for your taste buds to get used to less salty foods.    Putting the pieces of the DASH diet together  Try these strategies to get started on the DASH diet:    Change gradually. If you now eat only one or two servings of fruits or vegetables a day, try to add a serving at lunch and one at dinner. Rather than switching to all whole grains, start by making one or two of your grain servings whole grains. Increasing fruits, vegetables and whole grains gradually can also help prevent bloating or diarrhea that may occur if you aren't used to eating a diet with lots of fiber. You can also try over-the-counter products to help reduce gas from beans and vegetables.  Reward successes and forgive slip-ups. Reward yourself with a nonfood treat for your accomplishments -- rent a movie, purchase a book or get together with a friend. Everyone slips, especially when learning something new. " Remember that changing your lifestyle is a long-term process. Find out what triggered your setback and then just  where you left off with the DASH diet.  Add physical activity. To boost your blood pressure lowering efforts even more, consider increasing your physical activity in addition to following the DASH diet. Combining both the DASH diet and physical activity makes it more likely that you'll reduce your blood pressure.  Get support if you need it. If you're having trouble sticking to your diet, talk to your doctor or dietitian about it. You might get some tips that will help you stick to the DASH diet.  Remember, healthy eating isn't an all-or-nothing proposition. What's most important is that, on average, you eat healthier foods with plenty of variety -- both to keep your diet nutritious and to avoid boredom or extremes. And with the DASH diet, you can have both.

## 2018-11-07 ENCOUNTER — MEDICAL CORRESPONDENCE (OUTPATIENT)
Dept: HEALTH INFORMATION MANAGEMENT | Facility: CLINIC | Age: 43
End: 2018-11-07

## 2018-12-17 ENCOUNTER — MEDICAL CORRESPONDENCE (OUTPATIENT)
Dept: HEALTH INFORMATION MANAGEMENT | Facility: CLINIC | Age: 43
End: 2018-12-17

## 2021-12-28 ENCOUNTER — OFFICE VISIT (OUTPATIENT)
Dept: FAMILY MEDICINE | Facility: CLINIC | Age: 46
End: 2021-12-28
Payer: COMMERCIAL

## 2021-12-28 VITALS
SYSTOLIC BLOOD PRESSURE: 154 MMHG | DIASTOLIC BLOOD PRESSURE: 102 MMHG | TEMPERATURE: 97.7 F | BODY MASS INDEX: 38.92 KG/M2 | WEIGHT: 281 LBS | RESPIRATION RATE: 16 BRPM | OXYGEN SATURATION: 96 % | HEART RATE: 83 BPM

## 2021-12-28 DIAGNOSIS — L25.3 CONTACT DERMATITIS DUE TO OTHER CHEMICAL PRODUCT, UNSPECIFIED CONTACT DERMATITIS TYPE: ICD-10-CM

## 2021-12-28 DIAGNOSIS — M77.11 LATERAL EPICONDYLITIS OF RIGHT ELBOW: ICD-10-CM

## 2021-12-28 DIAGNOSIS — Z23 NEED FOR PROPHYLACTIC VACCINATION AND INOCULATION AGAINST INFLUENZA: Primary | ICD-10-CM

## 2021-12-28 PROCEDURE — 90471 IMMUNIZATION ADMIN: CPT

## 2021-12-28 PROCEDURE — 90686 IIV4 VACC NO PRSV 0.5 ML IM: CPT

## 2021-12-28 PROCEDURE — 0001A COVID-19,PF,PFIZER (12+ YRS): CPT

## 2021-12-28 PROCEDURE — 99202 OFFICE O/P NEW SF 15 MIN: CPT | Mod: 25

## 2021-12-28 PROCEDURE — 91300 COVID-19,PF,PFIZER (12+ YRS): CPT

## 2021-12-28 RX ORDER — LOSARTAN POTASSIUM AND HYDROCHLOROTHIAZIDE 12.5; 1 MG/1; MG/1
1 TABLET ORAL DAILY
COMMUNITY
End: 2023-07-10

## 2021-12-28 RX ORDER — ATORVASTATIN CALCIUM 40 MG/1
40 TABLET, FILM COATED ORAL DAILY
COMMUNITY
End: 2023-10-02

## 2021-12-28 NOTE — PROGRESS NOTES
Preceptor Attestation:  I discussed the patient with the resident and evaluated the patient in person. I have verified the content of the note, which accurately reflects my assessment of the patient and the plan of care.  Supervising Physician:  Haylie Garcia MD.

## 2021-12-28 NOTE — PATIENT INSTRUCTIONS
Hand dermatitis:  Moisturizers I like: (thicker version (cream) is best)  Vanicream  Cetaphil  CeraVe  - Apply one of these lotions or creams to whole body daily and especially right after bathing  (apply twice daily in the winter)     Avoid anything that is scented      Exercises for Tennis Elbow:  [email or web address]/tennis-elbow-rehab#wrist-flexion    Fist clench  Poor  strength is a common symptom of tennis elbow. Improving  strength by building the muscles of the forearm can help improve ability to perform daily activities.    Equipment needed: table and towel    Muscles worked: long flexor tendons of the fingers and thumb    Sit at a table with your forearm resting on the table.  Hold a rolled up towel or small ball in your hand.  Squeeze the towel in your hand and hold for 10 seconds.  Release and repeat 10 times. Switch and do the other arm.    Supination with a dumbbell  The supinator muscle is a large muscle of the forearm that attaches into the elbow. It s responsible for turning the palm upward and is often involved in movements that can cause tennis elbow.    Equipment needed: table and 2-pound dumbbell    Muscles worked: supinator muscle    Sit in a chair holding a 2-pound dumbbell vertically in your hand with your elbow resting on your knee.  Let the weight of the dumbbell help rotate the arm outward, turning the palm up.  Rotate the hand back the other direction until your palm is facing downward.  Repeat 20 times on each side.  Try to isolate the movement to your lower arm, keeping your upper arm and elbow still.      Wrist extension  The wrist extensors are a group of muscles that are responsible for bending the wrist, like during the hand signal for stop. These small muscles that connect into the elbow are often subject to overuse, especially during racquet sports.    Equipment needed: table and 2-pound dumbbell    Muscles worked: wrist extensors    Sit in a  chair holding a 2-pound dumbbell in your hand with your palm facing down, resting your elbow comfortably on your knee.  Keeping your palm facing down, extend your wrist by curling it towards your body. If this is too challenging, do the movement with no weight.  Return to starting position and repeat 10 times on each side.  Try to isolate the movement to the wrist, keeping the rest of the arm still.    Wrist flexion  The wrist flexors are a group of muscles that work opposite the wrist extensors. These small muscles that connect into the elbow are also subject to overuse, leading to pain and inflammation.    Equipment needed: table and 2-pound dumbbell    Muscles worked: wrist flexors    Sit in a chair holding a 2-pound dumbbell in your hand with your palm facing up and elbow resting comfortably on your knee.  Keeping your palm facing up, flex your wrist by curling it towards your body.  Return to starting position and repeat 10 times on each side.  Try to isolate the movement to the wrist, keeping the rest of the arm still.

## 2021-12-28 NOTE — PROGRESS NOTES
"  Assessment & Plan   Lateral epicondylitis of right elbow  1 month of decreased supination, extension at elbow, and localized pain likely from increased training of others at work. Some improvement with rest and taking Aleve at home.   - Continue Aleve as needed  - Exercises provided (see AVS)    Need for prophylactic vaccination and inoculation against influenza  - INFLUENZA VACCINE IM > 6 MONTHS VALENT IIV4 (AFLURIA/FLUZONE)    Contact dermatitis due to other chemical product, unspecified contact dermatitis type  Months of a rash on hand, likely a contact dermatitis due to localized region and work with chemicals, though unspecified trigger. Patient is not bothered by dermatitis at this point, discussed options and patient would like to try moisturizing and can return to steroid treatment if desired  - Provided education and options for moisturizing (see AVS)          Return in about 1 month (around 1/28/2022) for diabetes and HTN.    Deb Meyer MD  [facility]   Carlito is a 46 year old who presents for the following health issues    -R elbow:  1 month of decreased supination and extension. Some improvement with rest and taking Aleve at home which helps.     -hand dermatitis  \"off and on\" rash on hand, not pruritic, no pain, not worsening. 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 Isn't bothered by the rash, but his partner was mentioning it to him so he wondered what it was. Has not used anything to treat it.     -COVID-19: No questions or concerns with the vaccine    -HTN: Discussed RTC to discuss further given elevation today         Review of Systems   As above      Objective    BP (!) 154/102 (BP Location: Left arm, Patient Position: Sitting, Cuff Size: Adult Large)   Pulse 83   Temp 97.7  F (36.5  C) (Oral)   Resp 16   Wt 127.5 kg (281 lb)   SpO2 96%   BMI 38.92 kg/m    Body mass index is 38.92 " kg/m .  Physical Exam  Constitutional:       General: He is not in acute distress.     Appearance: Normal appearance.   Eyes:      Extraocular Movements: Extraocular movements intact.      Conjunctiva/sclera: Conjunctivae normal.   Pulmonary:      Effort: Pulmonary effort is normal. No respiratory distress.   Musculoskeletal:      Comments: R elbow pain with active supination and extension of elbow, localized pain to lateral epicondyle.   Skin:     General: Skin is warm and dry.      Comments: R hand with small, skin colored, scattered papules localized to distal area of dorsum of hand. Xerosis of hands    Neurological:      Mental Status: He is alert.   Psychiatric:         Mood and Affect: Mood normal.         Behavior: Behavior normal.

## 2022-02-01 NOTE — PROGRESS NOTES
Assessment & Plan     1. Benign essential hypertension  Blood pressure elevated today. Has been off antihypertnsives for some time. Previously on hydrochlorothiazide-losartan and also amlodipine. Will check CMP and start hydrochlorothiazide-losartan today. Could consider restarting amlodipine at next visit if not well-controlled. Would recheck BMP at next visit as well.   - Comprehensive metabolic panel; Future  - lisinopril-hydrochlorothiazide (ZESTORETIC) 10-12.5 MG tablet; Take 1 tablet by mouth daily  Dispense: 30 tablet; Refill: 1  - Comprehensive metabolic panel    2. Right elbow pain  Several weeks of non-specific right elbow pain without any known trauma. Previously diagnosed with lateral epicondylitis, but no specific lateral epicondyl pain on palpation today. Will get x-ray d/t the catching sensation he notes and will start with scheduled NSAID for 2 weeks. Also PT.   - XR Elbow Right G/E 3 Views; Future    3. Routine general medical examination at a health care facility  Due for lipid and A1C recheck. Scheduling CPE at next visit.   - Lipid Profile; Future  - Hemoglobin A1c; Future  - Hemoglobin A1c  - Lipid Profile    4. High priority for 2019-nCoV vaccine  - COVID-19,PF,PFIZER (12+ Yrs GRAY LABEL)      Ordering of each unique test  Prescription drug management  25 minutes spent on the date of the encounter doing chart review, history and exam, documentation and further activities per the note     Return in about 2 weeks (around 2/18/2022) for Follow up.    Kaye Palma MD PGY3  [facility] is a 46 year old who presents for the following health issues:    HPI   1. Blood pressure. Previously prescribed losartan-hydrochlorothiazide and also amlodipine but hasn't been on either of these for several months now. Doesn't check at home.  2. History of diabetes. Not taking metformin. Last A1C 5.1 2018.   3. COVID- second pfiezer shot. No know exposures or sx.    4. Elbow pain. About 1 month ago he woke up with severe pain. Felt like it was broken. Now getting stronger but pain is still there. No recollection of injury or trauma. No medications. He saw Dr. Meyer in December and was diagnosed with lateral epicondylitis. Doing some stretches. He sometimes feels like it gets caught when flexing. 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      Review of Systems   Constitutional, HEENT, cardiovascular, pulmonary, gi and gu systems are negative, except as otherwise noted.      Objective    BP (!) 155/102 (BP Location: Left arm, Patient Position: Sitting, Cuff Size: Adult Large)   Pulse 81   Temp 98.2  F (36.8  C) (Oral)   Resp 20   Wt 127 kg (280 lb)   SpO2 98%   BMI 38.78 kg/m    Body mass index is 38.78 kg/m .  Physical Exam   GENERAL: healthy, alert and no distress  NECK: no adenopathy, no asymmetry, masses, or scars and thyroid normal to palpation  RESP: lungs clear to auscultation - no rales, rhonchi or wheezes  CV: regular rate and rhythm, normal S1 S2, no S3 or S4, no murmur, click or rub, no peripheral edema and peripheral pulses strong  ABDOMEN: soft, nontender, no hepatosplenomegaly, no masses and bowel sounds normal  MS: RUE- normal strength and ROM; no specific tenderness to lateral or medial epicondyl; no obvious swelling; some generalized pain with any movement, especially flexion and pronation    No results found for this or any previous visit (from the past 24 hour(s)).

## 2022-02-04 ENCOUNTER — OFFICE VISIT (OUTPATIENT)
Dept: FAMILY MEDICINE | Facility: CLINIC | Age: 47
End: 2022-02-04
Payer: COMMERCIAL

## 2022-02-04 ENCOUNTER — ANCILLARY PROCEDURE (OUTPATIENT)
Dept: GENERAL RADIOLOGY | Facility: CLINIC | Age: 47
End: 2022-02-04
Attending: STUDENT IN AN ORGANIZED HEALTH CARE EDUCATION/TRAINING PROGRAM
Payer: COMMERCIAL

## 2022-02-04 VITALS
RESPIRATION RATE: 20 BRPM | WEIGHT: 280 LBS | DIASTOLIC BLOOD PRESSURE: 102 MMHG | SYSTOLIC BLOOD PRESSURE: 155 MMHG | OXYGEN SATURATION: 98 % | BODY MASS INDEX: 38.78 KG/M2 | TEMPERATURE: 98.2 F | HEART RATE: 81 BPM

## 2022-02-04 DIAGNOSIS — M25.521 RIGHT ELBOW PAIN: ICD-10-CM

## 2022-02-04 DIAGNOSIS — Z23 HIGH PRIORITY FOR 2019-NCOV VACCINE: ICD-10-CM

## 2022-02-04 DIAGNOSIS — I10 BENIGN ESSENTIAL HYPERTENSION: Primary | ICD-10-CM

## 2022-02-04 DIAGNOSIS — Z00.00 ROUTINE GENERAL MEDICAL EXAMINATION AT A HEALTH CARE FACILITY: ICD-10-CM

## 2022-02-04 LAB
ALBUMIN SERPL-MCNC: 4.3 G/DL (ref 3.5–5)
ALP SERPL-CCNC: 71 U/L (ref 45–120)
ALT SERPL W P-5'-P-CCNC: 47 U/L (ref 0–45)
ANION GAP SERPL CALCULATED.3IONS-SCNC: 11 MMOL/L (ref 5–18)
AST SERPL W P-5'-P-CCNC: 26 U/L (ref 0–40)
BILIRUB SERPL-MCNC: 0.7 MG/DL (ref 0–1)
BUN SERPL-MCNC: 13 MG/DL (ref 8–22)
CALCIUM SERPL-MCNC: 9.3 MG/DL (ref 8.5–10.5)
CHLORIDE BLD-SCNC: 106 MMOL/L (ref 98–107)
CHOLEST SERPL-MCNC: 138 MG/DL
CO2 SERPL-SCNC: 25 MMOL/L (ref 22–31)
CREAT SERPL-MCNC: 0.85 MG/DL (ref 0.7–1.3)
FASTING STATUS PATIENT QL REPORTED: ABNORMAL
GFR SERPL CREATININE-BSD FRML MDRD: >90 ML/MIN/1.73M2
GLUCOSE BLD-MCNC: 135 MG/DL (ref 70–125)
HBA1C MFR BLD: 6 % (ref 0–5.6)
HDLC SERPL-MCNC: 31 MG/DL
LDLC SERPL CALC-MCNC: 82 MG/DL
POTASSIUM BLD-SCNC: 4.3 MMOL/L (ref 3.5–5)
PROT SERPL-MCNC: 7.6 G/DL (ref 6–8)
SODIUM SERPL-SCNC: 142 MMOL/L (ref 136–145)
TRIGL SERPL-MCNC: 126 MG/DL

## 2022-02-04 PROCEDURE — 80061 LIPID PANEL: CPT | Performed by: STUDENT IN AN ORGANIZED HEALTH CARE EDUCATION/TRAINING PROGRAM

## 2022-02-04 PROCEDURE — 36415 COLL VENOUS BLD VENIPUNCTURE: CPT | Performed by: STUDENT IN AN ORGANIZED HEALTH CARE EDUCATION/TRAINING PROGRAM

## 2022-02-04 PROCEDURE — 83036 HEMOGLOBIN GLYCOSYLATED A1C: CPT | Performed by: STUDENT IN AN ORGANIZED HEALTH CARE EDUCATION/TRAINING PROGRAM

## 2022-02-04 PROCEDURE — 73080 X-RAY EXAM OF ELBOW: CPT | Mod: RT | Performed by: RADIOLOGY

## 2022-02-04 PROCEDURE — 0052A COVID-19,PF,PFIZER (12+ YRS): CPT | Performed by: STUDENT IN AN ORGANIZED HEALTH CARE EDUCATION/TRAINING PROGRAM

## 2022-02-04 PROCEDURE — 99214 OFFICE O/P EST MOD 30 MIN: CPT | Mod: 25 | Performed by: STUDENT IN AN ORGANIZED HEALTH CARE EDUCATION/TRAINING PROGRAM

## 2022-02-04 PROCEDURE — 80053 COMPREHEN METABOLIC PANEL: CPT | Performed by: STUDENT IN AN ORGANIZED HEALTH CARE EDUCATION/TRAINING PROGRAM

## 2022-02-04 PROCEDURE — 91305 COVID-19,PF,PFIZER (12+ YRS): CPT | Performed by: STUDENT IN AN ORGANIZED HEALTH CARE EDUCATION/TRAINING PROGRAM

## 2022-02-04 RX ORDER — LISINOPRIL/HYDROCHLOROTHIAZIDE 10-12.5 MG
1 TABLET ORAL DAILY
Qty: 30 TABLET | Refills: 1 | Status: SHIPPED | OUTPATIENT
Start: 2022-02-04 | End: 2023-07-10

## 2022-02-04 NOTE — PROGRESS NOTES
Preceptor Attestation:    I discussed the patient with the resident and evaluated the patient in person. I have verified the content of the note, which accurately reflects my assessment of the patient and the plan of care.   Supervising Physician:  Pb Hickman MD.

## 2023-07-10 ENCOUNTER — OFFICE VISIT (OUTPATIENT)
Dept: FAMILY MEDICINE | Facility: CLINIC | Age: 48
End: 2023-07-10
Payer: COMMERCIAL

## 2023-07-10 VITALS
HEIGHT: 72 IN | OXYGEN SATURATION: 97 % | TEMPERATURE: 97.9 F | BODY MASS INDEX: 30.61 KG/M2 | WEIGHT: 226 LBS | SYSTOLIC BLOOD PRESSURE: 161 MMHG | HEART RATE: 96 BPM | DIASTOLIC BLOOD PRESSURE: 107 MMHG | RESPIRATION RATE: 18 BRPM

## 2023-07-10 DIAGNOSIS — Z11.59 NEED FOR HEPATITIS C SCREENING TEST: ICD-10-CM

## 2023-07-10 DIAGNOSIS — Z00.00 ROUTINE GENERAL MEDICAL EXAMINATION AT A HEALTH CARE FACILITY: Primary | ICD-10-CM

## 2023-07-10 DIAGNOSIS — E11.65 TYPE 2 DIABETES MELLITUS WITH HYPERGLYCEMIA, WITHOUT LONG-TERM CURRENT USE OF INSULIN (H): ICD-10-CM

## 2023-07-10 DIAGNOSIS — I10 BENIGN ESSENTIAL HYPERTENSION: ICD-10-CM

## 2023-07-10 DIAGNOSIS — Z12.11 SCREEN FOR COLON CANCER: ICD-10-CM

## 2023-07-10 DIAGNOSIS — I10 ESSENTIAL HYPERTENSION: ICD-10-CM

## 2023-07-10 PROCEDURE — 99396 PREV VISIT EST AGE 40-64: CPT | Mod: GC

## 2023-07-10 RX ORDER — CALCIUM POLYCARBOPHIL 625 MG 625 MG/1
2 TABLET ORAL DAILY
Qty: 90 TABLET | Refills: 1 | Status: SHIPPED | OUTPATIENT
Start: 2023-07-10 | End: 2023-10-23

## 2023-07-10 RX ORDER — AMLODIPINE BESYLATE 5 MG/1
5 TABLET ORAL DAILY
Qty: 90 TABLET | Refills: 3 | Status: SHIPPED | OUTPATIENT
Start: 2023-07-10 | End: 2024-08-23

## 2023-07-10 RX ORDER — LISINOPRIL/HYDROCHLOROTHIAZIDE 10-12.5 MG
1 TABLET ORAL DAILY
Qty: 30 TABLET | Refills: 1 | Status: SHIPPED | OUTPATIENT
Start: 2023-07-10 | End: 2023-10-23

## 2023-07-10 RX ORDER — LOSARTAN POTASSIUM AND HYDROCHLOROTHIAZIDE 12.5; 1 MG/1; MG/1
1 TABLET ORAL DAILY
Qty: 30 TABLET | Refills: 1 | Status: SHIPPED | OUTPATIENT
Start: 2023-07-10 | End: 2023-07-10

## 2023-07-10 ASSESSMENT — ENCOUNTER SYMPTOMS
DIARRHEA: 0
CONSTIPATION: 0
COUGH: 0
HEMATURIA: 0
FREQUENCY: 0
ARTHRALGIAS: 0
CHILLS: 0
HEARTBURN: 0
NERVOUS/ANXIOUS: 1
EYE PAIN: 0
ABDOMINAL PAIN: 0
HEMATOCHEZIA: 0
DIZZINESS: 0

## 2023-07-10 NOTE — PROGRESS NOTES
"SUBJECTIVE:   CC: Carlito is an 48 year old who presents for preventative health visit.        No data to display              Healthy Habits:     Getting at least 3 servings of Calcium per day:  Yes    Bi-annual eye exam:  NO    Dental care twice a year:  NO    Sleep apnea or symptoms of sleep apnea:  Sleep apnea    Diet:  Low salt and Other    Frequency of exercise:  2-3 days/week    Duration of exercise:  Less than 15 minutes    Taking medications regularly:  Yes    Medication side effects:  None    Additional concerns today:  No          Annual Wellness Visit    Physical Health:    In general, how would you rate your overall physical health? good    Outside of work, how many days during the week do you exercise?2-3 days/week    Outside of work, approximately how many minutes a day do you exercise?15-30 minutes  If you drink alcohol do you typically have >3 drinks per day or >7 drinks per week? Yes - AUDIT SCORE:          No data to display                   Do you usually eat at least 4 servings of fruit and vegetables a day, include whole grains & fiber and avoid regularly eating high fat or \"junk\" foods? Yes    Do you have any problems taking medications regularly? YES    Do you have any side effects from medications? not applicable    Needs assistance for the following daily activities: no assistance needed    Which of the following safety concerns are present in your home?  none identified     Hearing impairment: No    In the past 6 months, have you been bothered by leaking of urine? Urinating more at night.    Mental Health:    In general, how would you rate your overall mental or emotional health? excellent  PHQ-2 Score: 1    Do you feel safe in your environment? Yes    Have you ever done Advance Care Planning? (For example, a Health Directive, POLST, or a discussion with a medical provider or your loved ones about your wishes)? No, advance care planning information given to patient to review.  Patient " plans to discuss their wishes with loved ones or provider.      Do you have sleep apnea, excessive snoring or daytime drowsiness?: yes    Social History     Tobacco Use     Smoking status: Every Day     Types: Other     Smokeless tobacco: Never   Substance Use Topics     Alcohol use: No             7/10/2023     9:15 AM   Alcohol Use   Prescreen: >3 drinks/day or >7 drinks/week? No     Do you have a current opioid prescription? No  Do you use any other controlled substances or medications that are not prescribed by a provider? Cannabis    Current providers sharing in care for this patient include:   Patient Care Team:  Jose Luis Shaffer MD as PCP - General (Student in organized health care education/training program)  Shantanu Washington MD as Assigned PCP    Urinary concerns  For several months now the patient has noticed decreased urinary flow and peeing frequently during the night.  Also drinking a lot of fluid during the day and being very thirsty.  Does not feel that he has to strain to urinate or that he is unable to empty his bladder.  Has had 1 or 2 occasions of erectile dysfunction.  Has been doing a lot of research at home about the prostate and is wanting to know more about that today.  No family history of prostate cancer.    Hypertension Follow-up      Do you check your blood pressure regularly outside of the clinic? No     Are you following a low salt diet? No    Are your blood pressures ever more than 140 on the top number (systolic) OR more   than 90 on the bottom number (diastolic), for example 140/90? Yes    Have you ever done Advance Care Planning? (For example, a Health Directive, POLST, or a discussion with a medical provider or your loved ones about your wishes): No, advance care planning information given to patient to review.  Patient plans to discuss their wishes with loved ones or provider.      Social History     Tobacco Use     Smoking status: Every Day     Types: Other     Smokeless  tobacco: Never   Substance Use Topics     Alcohol use: No       Last PSA: No results found for: PSA    Reviewed orders with patient. Reviewed health maintenance and updated orders accordingly - Yes  Lab work is in process  Patient Active Problem List   Diagnosis     Daytime somnolence     Type 2 diabetes mellitus with hyperglycemia, without long-term current use of insulin (H)     Benign essential hypertension     No past surgical history on file.    Social History     Tobacco Use     Smoking status: Every Day     Types: Other     Smokeless tobacco: Never   Substance Use Topics     Alcohol use: No     No family history on file.      Current Outpatient Medications   Medication Sig Dispense Refill     amLODIPine (NORVASC) 5 MG tablet Take 1 tablet (5 mg) by mouth daily 90 tablet 3     calcium polycarbophil (FIBERCON) 625 MG tablet Take 2 tablets (1,250 mg) by mouth daily 90 tablet 1     lisinopril-hydrochlorothiazide (ZESTORETIC) 10-12.5 MG tablet Take 1 tablet by mouth daily 30 tablet 1     atorvastatin (LIPITOR) 40 MG tablet Take 40 mg by mouth daily       Blood Pressure Monitoring (ADULT BLOOD PRESSURE CUFF LG) KIT Blood pressure monitoring 1 kit 0     capsaicin (ZOSTRIX) 0.025 % CREA cream May apply up to 3 times daily to muscle strain 45 g 1     Cholecalciferol (VITAMIN D) 2000 UNITS tablet Take 2,000 Units by mouth daily 100 tablet 3     metFORMIN (GLUCOPHAGE-XR) 500 MG 24 hr tablet Take 500 mg by mouth 2 times daily        multivitamin, therapeutic with minerals (THERA-VIT-M) TABS Take 1 tablet by mouth daily        naproxen (NAPROSYN) 500 MG tablet Take 1 tablet (500 mg) by mouth 2 times daily as needed for moderate pain 30 tablet 1     order for DME Equipment being ordered: CPAP machine and supplies 1 each 0     order for DME Equipment being ordered: CPAP 1 Units 0     simvastatin (ZOCOR) 20 MG tablet Take 1 tablet (20 mg) by mouth At Bedtime 90 tablet 3     Allergies   Allergen Reactions     Shellfish  Allergy      Had it as a child     Recent Labs   Lab Test 02/04/22  0939 09/20/18  1008 11/16/15  1437   A1C 6.0* 5.1  --    LDL 82 78  --    HDL 31* 32.0*  --    TRIG 126 112.4  --    ALT 47*  --  23.0   CR 0.85 0.9 0.9   GFRESTIMATED >90 >90 98.4   GFRESTBLACK  --  >90 119.0   POTASSIUM 4.3 4.1 3.6        Reviewed and updated as needed this visit by clinical staff   Tobacco  Allergies               Reviewed and updated as needed this visit by Provider    Review of Systems   Constitutional: Negative for chills.   HENT: Negative for congestion and ear pain.    Eyes: Negative for pain.   Respiratory: Negative for cough.    Cardiovascular: Negative for chest pain.   Gastrointestinal: Negative for abdominal pain, constipation, diarrhea, heartburn and hematochezia.   Genitourinary: Negative for frequency, hematuria and urgency.   Musculoskeletal: Negative for arthralgias.   Neurological: Negative for dizziness.   Psychiatric/Behavioral: Negative for mood changes. The patient is nervous/anxious.        OBJECTIVE:   BP (!) 161/107   Pulse 96   Temp 97.9  F (36.6  C) (Oral)   Resp 18   Ht 1.829 m (6')   Wt 102.5 kg (226 lb)   SpO2 97%   BMI 30.65 kg/m      Physical Exam  GENERAL: healthy, alert and no distress  EYES: Eyes grossly normal to inspection, PERRL and conjunctivae and sclerae normal  HENT: ear canals and TM's normal, nose and mouth without ulcers or lesions  NECK: no adenopathy, no asymmetry, masses, or scars and thyroid normal to palpation  RESP: lungs clear to auscultation - no rales, rhonchi or wheezes  CV: regular rate and rhythm, normal S1 S2, no S3 or S4, no murmur, click or rub, no peripheral edema   ABDOMEN: soft, nontender, no hepatosplenomegaly, no masses and bowel sounds normal  MS: no gross musculoskeletal defects noted, no edema  SKIN: no suspicious lesions or rashes  NEURO: Normal strength and tone, mentation intact and speech normal  PSYCH: mentation appears normal, affect  normal/bright    Diagnostic Test Results:  Labs reviewed in Epic  No results found for any visits on 07/10/23.    ASSESSMENT/PLAN:       ICD-10-CM    1. Routine general medical examination at a health care facility  Z00.00 Lipid panel reflex to direct LDL Fasting     Fecal colorectal cancer screen FIT     Comprehensive Metabolic Panel     Hemoglobin A1c     calcium polycarbophil (FIBERCON) 625 MG tablet     Lipid panel reflex to direct LDL Fasting     Comprehensive metabolic panel     CANCELED: Basic Metabolic Panel     CANCELED: Hemoglobin A1c     CANCELED: Comprehensive Metabolic Panel     CANCELED: Fecal colorectal cancer screen FIT     CANCELED: Lipid panel reflex to direct LDL Fasting      2. Type 2 diabetes mellitus with hyperglycemia, without long-term current use of insulin (H)  E11.65 Hemoglobin A1c     Adult Eye  Referral     Albumin Random Urine Quantitative with Creat Ratio     Hemoglobin A1c     Albumin Random Urine Quantitative with Creat Ratio     CANCELED: Albumin Random Urine Quantitative with Creat Ratio     CANCELED: Hemoglobin A1c      3. Screen for colon cancer  Z12.11 Fecal colorectal cancer screen FIT      4. Need for hepatitis C screening test  Z11.59       5. Benign essential hypertension  I10 lisinopril-hydrochlorothiazide (ZESTORETIC) 10-12.5 MG tablet     DISCONTINUED: losartan-hydrochlorothiazide (HYZAAR) 100-12.5 MG tablet      6. Essential hypertension  I10 amLODIPine (NORVASC) 5 MG tablet            COUNSELING:   Reviewed preventive health counseling, as reflected in patient instructions       Regular exercise       Healthy diet/nutrition       Alcohol Use        Colorectal cancer screening       Prostate cancer screening       The 10-year ASCVD risk score (Aj LEE, et al., 2019) is: 38.5%    Values used to calculate the score:      Age: 48 years      Sex: Male      Is Non- : Yes      Diabetic: Yes      Tobacco smoker: Yes      Systolic Blood  Pressure: 161 mmHg      Is BP treated: Yes      HDL Cholesterol: 31 mg/dL      Total Cholesterol: 138 mg/dL        He reports that he has been smoking other. He has never used smokeless tobacco.      Jose Luis Shaffer MD  [facility]

## 2023-07-10 NOTE — PATIENT INSTRUCTIONS
Preventive Health Recommendations  Male Ages 50 - 64    Yearly exam:             See your health care provider every year in order to  o   Review health changes.   o   Discuss preventive care.    o   Review your medicines if your doctor has prescribed any.   Have a cholesterol test every 5 years, or more frequently if you are at risk for high cholesterol/heart disease.   Have a diabetes test (fasting glucose) every three years. If you are at risk for diabetes, you should have this test more often.   Have a colonoscopy at age 50, or have a yearly FIT test (stool test). These exams will check for colon cancer.    Talk with your health care provider about whether or not a prostate cancer screening test (PSA) is right for you.  You should be tested each year for STDs (sexually transmitted diseases), if you re at risk.     Shots: Get a flu shot each year. Get a tetanus shot every 10 years.     Nutrition:  Eat at least 5 servings of fruits and vegetables daily.   Eat whole-grain bread, whole-wheat pasta and brown rice instead of white grains and rice.   Get adequate Calcium and Vitamin D.     Blood pressure goals  120/80    Lifestyle  Exercise for at least 150 minutes a week (30 minutes a day, 5 days a week). This will help you control your weight and prevent disease.   Limit alcohol to one drink per day.   No smoking.   Wear sunscreen to prevent skin cancer.   See your dentist every six months for an exam and cleaning.   See your eye doctor every 1 to 2 years.

## 2023-07-10 NOTE — PROGRESS NOTES
Preceptor Attestation:  Vitals:    07/10/23 1013 07/10/23 1017   BP: (!) 156/103 (!) 161/107   BP Location: Right arm    Patient Position: Sitting    Cuff Size: Adult Large    Pulse: 96    Resp: 18    Temp: 97.9  F (36.6  C)    TempSrc: Oral    SpO2: 97%    Weight: 102.5 kg (226 lb)    Height: 1.829 m (6')           I discussed the patient with the resident and evaluated the patient in person. I have verified the content of the note, which accurately reflects my assessment of the patient and the plan of care.   Supervising Physician:  Navya Camacho MD

## 2023-09-11 ENCOUNTER — TELEPHONE (OUTPATIENT)
Dept: FAMILY MEDICINE | Facility: CLINIC | Age: 48
End: 2023-09-11

## 2023-09-11 DIAGNOSIS — I10 BENIGN ESSENTIAL HYPERTENSION: ICD-10-CM

## 2023-09-11 RX ORDER — LOSARTAN POTASSIUM AND HYDROCHLOROTHIAZIDE 12.5; 1 MG/1; MG/1
1 TABLET ORAL DAILY
Qty: 30 TABLET | Refills: 1 | Status: SHIPPED | OUTPATIENT
Start: 2023-09-11 | End: 2024-08-23

## 2023-09-11 NOTE — TELEPHONE ENCOUNTER
Refill of Hyzaar 100-12.5 mg.  Based on recent refill history looks like he is just taking the Hyzaar.  There was discussion about switching/increasing medications at his physical on July 10, 2023 and both Hyzaar and Zestoretic were sent to his pharmacy.  We will schedule follow-up to recheck blood pressure and clarify which medications he is taking.

## 2023-09-11 NOTE — TELEPHONE ENCOUNTER
----- Message from Jose Luis Shaffer MD sent at 9/11/2023  2:31 PM CDT -----  Continue have this patient follow-up in 2 to 4 weeks to recheck blood pressure and follow-up on labs?  Thank you!

## 2023-10-02 ENCOUNTER — TELEPHONE (OUTPATIENT)
Dept: FAMILY MEDICINE | Facility: CLINIC | Age: 48
End: 2023-10-02

## 2023-10-02 ENCOUNTER — OFFICE VISIT (OUTPATIENT)
Dept: FAMILY MEDICINE | Facility: CLINIC | Age: 48
End: 2023-10-02
Payer: COMMERCIAL

## 2023-10-02 VITALS
WEIGHT: 264 LBS | SYSTOLIC BLOOD PRESSURE: 131 MMHG | HEART RATE: 71 BPM | BODY MASS INDEX: 35.8 KG/M2 | DIASTOLIC BLOOD PRESSURE: 84 MMHG | OXYGEN SATURATION: 98 %

## 2023-10-02 DIAGNOSIS — E66.812 CLASS 2 SEVERE OBESITY DUE TO EXCESS CALORIES WITH SERIOUS COMORBIDITY AND BODY MASS INDEX (BMI) OF 35.0 TO 35.9 IN ADULT (H): ICD-10-CM

## 2023-10-02 DIAGNOSIS — I10 BENIGN ESSENTIAL HYPERTENSION: ICD-10-CM

## 2023-10-02 DIAGNOSIS — Z12.11 SCREEN FOR COLON CANCER: ICD-10-CM

## 2023-10-02 DIAGNOSIS — E11.65 TYPE 2 DIABETES MELLITUS WITH HYPERGLYCEMIA, WITHOUT LONG-TERM CURRENT USE OF INSULIN (H): Primary | ICD-10-CM

## 2023-10-02 DIAGNOSIS — E66.01 CLASS 2 SEVERE OBESITY DUE TO EXCESS CALORIES WITH SERIOUS COMORBIDITY AND BODY MASS INDEX (BMI) OF 35.0 TO 35.9 IN ADULT (H): ICD-10-CM

## 2023-10-02 LAB
ALBUMIN SERPL BCG-MCNC: 4.4 G/DL (ref 3.5–5.2)
ALP SERPL-CCNC: 76 U/L (ref 40–129)
ALT SERPL W P-5'-P-CCNC: 21 U/L (ref 0–70)
ANION GAP SERPL CALCULATED.3IONS-SCNC: 11 MMOL/L (ref 7–15)
AST SERPL W P-5'-P-CCNC: 17 U/L (ref 0–45)
BILIRUB SERPL-MCNC: 0.5 MG/DL
BUN SERPL-MCNC: 16.7 MG/DL (ref 6–20)
CALCIUM SERPL-MCNC: 9.2 MG/DL (ref 8.6–10)
CHLORIDE SERPL-SCNC: 99 MMOL/L (ref 98–107)
CHOLEST SERPL-MCNC: 171 MG/DL
CREAT SERPL-MCNC: 0.85 MG/DL (ref 0.67–1.17)
CREAT UR-MCNC: 335 MG/DL
DEPRECATED HCO3 PLAS-SCNC: 27 MMOL/L (ref 22–29)
EGFRCR SERPLBLD CKD-EPI 2021: >90 ML/MIN/1.73M2
GLUCOSE SERPL-MCNC: 348 MG/DL (ref 70–99)
HBA1C MFR BLD: 12 % (ref 0–5.6)
HDLC SERPL-MCNC: 36 MG/DL
LDLC SERPL CALC-MCNC: 106 MG/DL
MICROALBUMIN UR-MCNC: 17.1 MG/L
MICROALBUMIN/CREAT UR: 5.1 MG/G CR (ref 0–17)
NONHDLC SERPL-MCNC: 135 MG/DL
POTASSIUM SERPL-SCNC: 4.5 MMOL/L (ref 3.4–5.3)
PROT SERPL-MCNC: 7 G/DL (ref 6.4–8.3)
SODIUM SERPL-SCNC: 137 MMOL/L (ref 135–145)
TRIGL SERPL-MCNC: 147 MG/DL

## 2023-10-02 PROCEDURE — 99214 OFFICE O/P EST MOD 30 MIN: CPT | Mod: GC

## 2023-10-02 PROCEDURE — 80053 COMPREHEN METABOLIC PANEL: CPT

## 2023-10-02 PROCEDURE — 82043 UR ALBUMIN QUANTITATIVE: CPT

## 2023-10-02 PROCEDURE — 80061 LIPID PANEL: CPT

## 2023-10-02 PROCEDURE — 82570 ASSAY OF URINE CREATININE: CPT

## 2023-10-02 PROCEDURE — 36415 COLL VENOUS BLD VENIPUNCTURE: CPT

## 2023-10-02 PROCEDURE — 83036 HEMOGLOBIN GLYCOSYLATED A1C: CPT

## 2023-10-02 RX ORDER — ATORVASTATIN CALCIUM 40 MG/1
40 TABLET, FILM COATED ORAL DAILY
Qty: 90 TABLET | Refills: 0 | Status: SHIPPED | OUTPATIENT
Start: 2023-10-02 | End: 2024-01-02

## 2023-10-02 RX ORDER — METFORMIN HCL 500 MG
TABLET, EXTENDED RELEASE 24 HR ORAL
Qty: 402 TABLET | Refills: 0 | Status: SHIPPED | OUTPATIENT
Start: 2023-10-02 | End: 2024-08-23

## 2023-10-02 NOTE — PROGRESS NOTES
Assessment & Plan     Type 2 diabetes mellitus with hyperglycemia, without long-term current use of insulin (H)  Class 2 severe obesity due to excess calories with serious comorbidity and body mass index (BMI) of 35.0 to 35.9 in adult (H)  The patient's A1c today was 12.0 compared to 6.0 at 1 year ago.  He reports eating a lot of sweets at home and drinks 1-2 beers daily.  Also has a glass of juice at least once per day.  Does not get regular exercise.  Reports that he is ready to make some changes including already giving up sweets.  We discussed that at an A1c of 12.0 we recommend starting insulin initially to get the A1c down more quickly.  He is very averse to injectable medications and we discussed starting orals only though this would not work as quickly as we would ideally hope.  This was acceptable to him and he will also meet with the diabetes educator and make lifestyle modifications as well.  Given his elevated BMI we will start a GLP-1 and start metformin as well with a graduated ramp up to maximum dose.  - Hemoglobin A1c  - Albumin Random Urine Quantitative with Creat Ratio  - Amb Adult Diabetes Educator Referral - Routine  - atorvastatin (LIPITOR) 40 MG tablet  Dispense: 90 tablet; Refill: 0  -Start metFORMIN (GLUCOPHAGE XR) 500 MG 24 hr tablet; after 2 weeks increase to 1000 mg daily; 2 weeks after that increase to 2000 mg daily  -Start semaglutide (RYBELSUS) 3 MG tablet  Dispense: 30 tablet; Refill: 0    Benign essential hypertension  Patient's blood pressure in clinic today 131/84.  Patient reports that numbers at home are typically in the 130s over 80s.  Has tolerated his Hyzaar well with no reported side effects.  Not planning to make any medication changes today as patient will start making significant lifestyle changes given the above diagnosis of diabetes.  If blood pressure remains above goal at next visit consider increasing hydrochlorothiazide to 25 mg.  - Continue Hyzaar 100-12.5 mg  daily    Screen for colon cancer  - Fecal colorectal cancer screen FIT       BMI:   Estimated body mass index is 35.8 kg/m  as calculated from the following:    Height as of 7/10/23: 1.829 m (6').    Weight as of this encounter: 119.7 kg (264 lb).   Weight management plan: Discussed healthy diet and exercise guidelines and discussed starting GLP-1 as above.    Return in about 2 weeks (around 10/16/2023).    Jose Luis Shaffer MD  [facility] ONEIL Esteban is a 48 year old, presenting for the following health issues:  Hypertension        10/2/2023     9:59 AM   Additional Questions   Roomed by ngf   Accompanied by self, wife         10/2/2023     9:59 AM   Patient Reported Additional Medications   Patient reports taking the following new medications none       HPI     Diabetes Follow-up    How often are you checking your blood sugar? Not at all  What concerns do you have today about your diabetes? Other: Frequent urination and decreased libido   Do you have any of these symptoms? (Select all that apply)  Excessive thirst  Have you had a diabetic eye exam in the last 12 months? No    Hyperlipidemia Follow-Up    Are you regularly taking any medication or supplement to lower your cholesterol?   Yes- atorvastatin 40 mg daily  Are you having muscle aches or other side effects that you think could be caused by your cholesterol lowering medication?  No    Hypertension Follow-up    Do you check your blood pressure regularly outside of the clinic? Yes   Are you following a low salt diet? No  Are your blood pressures ever more than 140 on the top number (systolic) OR more   than 90 on the bottom number (diastolic), for example 140/90? No    BP Readings from Last 2 Encounters:   10/02/23 131/84   07/10/23 (!) 161/107     Hemoglobin A1C (%)   Date Value   10/02/2023 12.0 (H)   02/04/2022 6.0 (H)   09/20/2018 5.1   11/27/2013 4.7     LDL Cholesterol Calculated (mg/dL)   Date Value   10/02/2023 106  (H)   02/04/2022 82   09/20/2018 78     LDL Cholesterol Direct (mg/dL)   Date Value   11/27/2013 77.0     On average, how many sweetened beverages do you drink each day (Examples: soda, juice, sweet tea, etc.  Do NOT count diet or artificially sweetened beverages)?   1  How many days per week do you exercise enough to make your heart beat faster? 3 or less          Objective    /84 (BP Location: Left arm, Patient Position: Sitting, Cuff Size: Adult Large)   Pulse 71   Wt 119.7 kg (264 lb)   SpO2 98%   BMI 35.80 kg/m    Body mass index is 35.8 kg/m .  Physical Exam   GENERAL: healthy, alert and no distress  RESP: lungs clear to auscultation - no rales, rhonchi or wheezes  CV: regular rate and rhythm, normal S1 S2, no S3 or S4, no murmur, click or rub  SKIN: no suspicious lesions or rashes  PSYCH: mentation appears normal, affect normal/bright    Results for orders placed or performed in visit on 10/02/23 (from the past 24 hour(s))   Lipid panel reflex to direct LDL Fasting   Result Value Ref Range    Cholesterol 171 <200 mg/dL    Triglycerides 147 <150 mg/dL    Direct Measure HDL 36 (L) >=40 mg/dL    LDL Cholesterol Calculated 106 (H) <=100 mg/dL    Non HDL Cholesterol 135 (H) <130 mg/dL    Narrative    Cholesterol  Desirable:  <200 mg/dL    Triglycerides  Normal:  Less than 150 mg/dL  Borderline High:  150-199 mg/dL  High:  200-499 mg/dL  Very High:  Greater than or equal to 500 mg/dL    Direct Measure HDL  Female:  Greater than or equal to 50 mg/dL   Male:  Greater than or equal to 40 mg/dL    LDL Cholesterol  Desirable:  <100mg/dL  Above Desirable:  100-129 mg/dL   Borderline High:  130-159 mg/dL   High:  160-189 mg/dL   Very High:  >= 190 mg/dL    Non HDL Cholesterol  Desirable:  130 mg/dL  Above Desirable:  130-159 mg/dL  Borderline High:  160-189 mg/dL  High:  190-219 mg/dL  Very High:  Greater than or equal to 220 mg/dL   Comprehensive metabolic panel   Result Value Ref Range    Sodium 137 135 - 145  mmol/L    Potassium 4.5 3.4 - 5.3 mmol/L    Carbon Dioxide (CO2) 27 22 - 29 mmol/L    Anion Gap 11 7 - 15 mmol/L    Urea Nitrogen 16.7 6.0 - 20.0 mg/dL    Creatinine 0.85 0.67 - 1.17 mg/dL    GFR Estimate >90 >60 mL/min/1.73m2    Calcium 9.2 8.6 - 10.0 mg/dL    Chloride 99 98 - 107 mmol/L    Glucose 348 (H) 70 - 99 mg/dL    Alkaline Phosphatase 76 40 - 129 U/L    AST 17 0 - 45 U/L    ALT 21 0 - 70 U/L    Protein Total 7.0 6.4 - 8.3 g/dL    Albumin 4.4 3.5 - 5.2 g/dL    Bilirubin Total 0.5 <=1.2 mg/dL   Hemoglobin A1c   Result Value Ref Range    Hemoglobin A1C 12.0 (H) 0.0 - 5.6 %   Albumin Random Urine Quantitative with Creat Ratio   Result Value Ref Range    Creatinine Urine mg/dL 335.0 mg/dL    Albumin Urine mg/L 17.1 mg/L    Albumin Urine mg/g Cr 5.10 0.00 - 17.00 mg/g Cr       ----- Service Performed and Documented by Resident or Fellow ------

## 2023-10-02 NOTE — PROGRESS NOTES
Preceptor Attestation:   I discussed the patient with the resident and evaluated the patient in person. I have verified the content of the note, which accurately reflects my assessment of the patient and the plan of care.     Kenney Amaya MD

## 2023-10-04 PROCEDURE — 82274 ASSAY TEST FOR BLOOD FECAL: CPT

## 2023-10-06 NOTE — TELEPHONE ENCOUNTER
Central Prior Authorization Team   Phone: [phone]    PRIOR AUTHORIZATION DENIED    Medication: RYBELSUS 3 MG PO TABS  Insurance Company: [employer] - Phone [phone] Fax [phone]  Denial Date: 10/2/2023  Denial Rational: MUST TRY/FAIL TWO OR MORE PREFERRED ALTERNATIVES OR HAVE CONTRAINDICATIONS TO ALL - BYDUREON BCISE, BYETTA, VICTOZA, DOT JONES, JENTADUETO, LUPE XR, ONGLYZA, TRADJENTA      Appeal Information: IF PROVIDER WOULD LIKE TO APPEAL THIS DECISION PLEASE PROVIDE THE PA TEAM WITH A LETTER OF MEDICAL NECESSITY      Patient Notified: No         
MEDICATION APPEAL APPROVED    Medication: RYBELSUS 3 MG PO TABS  Authorization Effective Date: 9/4/2023  Authorization Expiration Date: 10/4/2024  Appeal Insurance Company: [employer]  Filling Pharmacy: [id number] - [place], [place] - [address] [place] [address] AT [facility] [address]  Patient Notified: Yes (pharmacy will notify patient when ready)  Comments:         
Medication Appeal Initiation    Medication: RYBELSUS 3 MG PO TABS  Appeal Start Date:  10/3/2023  Insurance Company: [employer]  Insurance Phone: [phone]  Insurance Fax: [phone]  Comments:         
PA DENIED - please see denial rationale.  If provider would like to appeal please provide LMN and route encounter back to me.  Otherwise please notify patient and close encounter when finished.       See message above from PA team.     SYBIL lBack 8:51 AM October 3, 2023    
Poor

## 2023-10-07 LAB — HEMOCCULT STL QL IA: NEGATIVE

## 2023-10-23 ENCOUNTER — OFFICE VISIT (OUTPATIENT)
Dept: FAMILY MEDICINE | Facility: CLINIC | Age: 48
End: 2023-10-23
Payer: COMMERCIAL

## 2023-10-23 VITALS
RESPIRATION RATE: 18 BRPM | OXYGEN SATURATION: 99 % | DIASTOLIC BLOOD PRESSURE: 87 MMHG | WEIGHT: 263.2 LBS | HEIGHT: 72 IN | TEMPERATURE: 98.7 F | HEART RATE: 81 BPM | BODY MASS INDEX: 35.65 KG/M2 | SYSTOLIC BLOOD PRESSURE: 131 MMHG

## 2023-10-23 DIAGNOSIS — E66.812 CLASS 2 SEVERE OBESITY DUE TO EXCESS CALORIES WITH SERIOUS COMORBIDITY AND BODY MASS INDEX (BMI) OF 35.0 TO 35.9 IN ADULT (H): ICD-10-CM

## 2023-10-23 DIAGNOSIS — E66.01 CLASS 2 SEVERE OBESITY DUE TO EXCESS CALORIES WITH SERIOUS COMORBIDITY AND BODY MASS INDEX (BMI) OF 35.0 TO 35.9 IN ADULT (H): ICD-10-CM

## 2023-10-23 DIAGNOSIS — E11.65 TYPE 2 DIABETES MELLITUS WITH HYPERGLYCEMIA, WITHOUT LONG-TERM CURRENT USE OF INSULIN (H): Primary | ICD-10-CM

## 2023-10-23 DIAGNOSIS — I10 BENIGN ESSENTIAL HYPERTENSION: ICD-10-CM

## 2023-10-23 PROCEDURE — 99214 OFFICE O/P EST MOD 30 MIN: CPT | Mod: GC

## 2023-10-23 NOTE — PROGRESS NOTES
Assessment & Plan     Type 2 diabetes mellitus with hyperglycemia, without long-term current use of insulin (H)  Class 2 severe obesity due to excess calories with serious comorbidity and body mass index (BMI) of 35.0 to 35.9 in adult (H)  Patient recently had A1c of 12.0 in clinic for general screening. Up from 6.0 one year ago. Started on Metformin and Rybelsus last visit. Plan to increase each to max dose. Patient was resistant to starting insulin despite his very elevated A1c. Has some occasional tingling in his feet which may be from diabetes. Today plan to increase metformin to 1000 mg daily and increase Rybelsus at next visit.   - Increase metformin to 1000 mg daily  - Continue Rybelsus 3 mg daily  - Patient info on diabetic diet given  - Adult Eye  Referral  -Continue atorvastatin 40 mg daily    Benign essential hypertension  Blood pressure is relatively well controlled today. Ideally less than 130/80. 131/87 today. Could consider increasing the hydrochlorothiazide or amlodipine. In the future.  - Home Blood Pressure Monitor Order for DME - ONLY FOR DME  - Information on DASH diet provided to patient  - Continue losartan-hydrochlorothiazide 100-12.5  - Continue amlodipine 5 mg daily      Return in about 10 weeks (around 1/1/2024) for Follow up.    Jose Luis Shaffer MD  [facility] is a 48 year old, presenting for the following health issues:  Results (Follow up ) and Recheck Medication      10/23/2023     8:26 AM   Additional Questions   Roomed by flako   Accompanied by self and girlfriend       HPI     Diabetes Follow-up  How often are you checking your blood sugar? Not at all  What concerns do you have today about your diabetes? None   Do you have any of these symptoms? (Select all that apply)  Burning in feet  Have you had a diabetic eye exam in the last 12 months? No      Hyperlipidemia Follow-Up    Are you regularly taking any medication or  supplement to lower your cholesterol?   Yes- atorvastatin  Are you having muscle aches or other side effects that you think could be caused by your cholesterol lowering medication?  No    Hypertension Follow-up    Do you check your blood pressure regularly outside of the clinic? No   Are you following a low salt diet? Yes    BP Readings from Last 2 Encounters:   10/23/23 131/87   10/02/23 131/84     Hemoglobin A1C (%)   Date Value   10/02/2023 12.0 (H)   02/04/2022 6.0 (H)   09/20/2018 5.1   11/27/2013 4.7     LDL Cholesterol Calculated (mg/dL)   Date Value   10/02/2023 106 (H)   02/04/2022 82   09/20/2018 78     LDL Cholesterol Direct (mg/dL)   Date Value   11/27/2013 77.0     How many servings of fruits and vegetables do you eat daily?  0-1  On average, how many sweetened beverages do you drink each day (Examples: soda, juice, sweet tea, etc.  Do NOT count diet or artificially sweetened beverages)?   Cutting down on juice intake.  How many days per week do you exercise enough to make your heart beat faster? 5  How many minutes a day do you exercise enough to make your heart beat faster? 60 or more  How many days per week do you miss taking your medication? 0          Objective    /87   Pulse 81   Temp 98.7  F (37.1  C) (Oral)   Resp 18   Ht 1.829 m (6')   Wt 119.4 kg (263 lb 3.2 oz)   SpO2 99%   BMI 35.70 kg/m    Body mass index is 35.7 kg/m .  Physical Exam   GENERAL: healthy, alert and no distress  RESP: lungs clear to auscultation - no rales, rhonchi or wheezes  CV: regular rate and rhythm, normal S1 S2, no S3 or S4, no murmur, click or rub, no peripheral edema and peripheral pulses strong    Office Visit on 10/02/2023   Component Date Value Ref Range Status    Occult Blood Screen FIT 10/04/2023 Negative  Negative Final    Cholesterol 10/02/2023 171  <200 mg/dL Final    Triglycerides 10/02/2023 147  <150 mg/dL Final    Direct Measure HDL 10/02/2023 36 (L)  >=40 mg/dL Final    LDL Cholesterol  Calculated 10/02/2023 106 (H)  <=100 mg/dL Final    Non HDL Cholesterol 10/02/2023 135 (H)  <130 mg/dL Final    Sodium 10/02/2023 137  135 - 145 mmol/L Final    Reference intervals for this test were updated on 09/26/2023 to more accurately reflect our healthy population. There may be differences in the flagging of prior results with similar values performed with this method. Interpretation of those prior results can be made in the context of the updated reference intervals.     Potassium 10/02/2023 4.5  3.4 - 5.3 mmol/L Final    Carbon Dioxide (CO2) 10/02/2023 27  22 - 29 mmol/L Final    Anion Gap 10/02/2023 11  7 - 15 mmol/L Final    Urea Nitrogen 10/02/2023 16.7  6.0 - 20.0 mg/dL Final    Creatinine 10/02/2023 0.85  0.67 - 1.17 mg/dL Final    GFR Estimate 10/02/2023 >90  >60 mL/min/1.73m2 Final    Calcium 10/02/2023 9.2  8.6 - 10.0 mg/dL Final    Chloride 10/02/2023 99  98 - 107 mmol/L Final    Glucose 10/02/2023 348 (H)  70 - 99 mg/dL Final    Alkaline Phosphatase 10/02/2023 76  40 - 129 U/L Final    AST 10/02/2023 17  0 - 45 U/L Final    Reference intervals for this test were updated on 6/12/2023 to more accurately reflect our healthy population. There may be differences in the flagging of prior results with similar values performed with this method. Interpretation of those prior results can be made in the context of the updated reference intervals.    ALT 10/02/2023 21  0 - 70 U/L Final    Reference intervals for this test were updated on 6/12/2023 to more accurately reflect our healthy population. There may be differences in the flagging of prior results with similar values performed with this method. Interpretation of those prior results can be made in the context of the updated reference intervals.      Protein Total 10/02/2023 7.0  6.4 - 8.3 g/dL Final    Albumin 10/02/2023 4.4  3.5 - 5.2 g/dL Final    Bilirubin Total 10/02/2023 0.5  <=1.2 mg/dL Final    Hemoglobin A1C 10/02/2023 12.0 (H)  0.0 - 5.6 %  Final    Creatinine Urine mg/dL 10/02/2023 335.0  mg/dL Final    The reference ranges have not been established in urine creatinine. The results should be integrated into the clinical context for interpretation.    Albumin Urine mg/L 10/02/2023 17.1  mg/L Final    The reference ranges have not been established in urine albumin. The results should be integrated into the clinical context for interpretation.    Albumin Urine mg/g Cr 10/02/2023 5.10  0.00 - 17.00 mg/g Cr Final    Microalbuminuria is defined as an albumin:creatinine ratio of 17 to 299 for males and 25 to 299 for females. A ratio of albumin:creatinine of 300 or higher is indicative of overt proteinuria.  Due to biologic variability, positive results should be confirmed by a second, first-morning random or 24-hour timed urine specimen. If there is discrepancy, a third specimen is recommended. When 2 out of 3 results are in the microalbuminuria range, this is evidence for incipient nephropathy and warrants increased efforts at glucose control, blood pressure control, and institution of therapy with an angiotensin-converting-enzyme (ACE) inhibitor (if the patient can tolerate it).         ----- Service Performed and Documented by Resident or Fellow ------

## 2023-10-23 NOTE — PROGRESS NOTES
Physician Attestation   I, Kenney Amaya MD, saw this patient and agree with the findings and plan of care as documented in the note.      Items personally reviewed/procedural attestation: labs.    Kenney Amaya MD

## 2024-02-01 ENCOUNTER — TELEPHONE (OUTPATIENT)
Dept: FAMILY MEDICINE | Facility: CLINIC | Age: 49
End: 2024-02-01
Payer: COMMERCIAL

## 2024-02-01 NOTE — TELEPHONE ENCOUNTER
Schedule pt to come back to [facility] on 3/4/24 @ 8;20am.      Luz King MA          ----- Message from Jose Luis Shaffer MD sent at 1/30/2024  4:27 PM CST -----  Can you please call Carlito and help him schedule a diabetes follow up in the next 1-6 weeks? Thank you!

## 2024-03-04 DIAGNOSIS — E11.65 TYPE 2 DIABETES MELLITUS WITH HYPERGLYCEMIA, WITHOUT LONG-TERM CURRENT USE OF INSULIN (H): Primary | ICD-10-CM

## 2024-06-04 NOTE — PATIENT INSTRUCTIONS
Take 400 mg ibuprofen 2-3 times per day for 2 weeks (take with food).     Physical therapy will call to schedule an appointment.     I will call with x-ray and lab results.      your blood pressure medication and start taking it once daily.   
No Assistant

## 2024-07-31 DIAGNOSIS — E11.65 TYPE 2 DIABETES MELLITUS WITH HYPERGLYCEMIA, WITHOUT LONG-TERM CURRENT USE OF INSULIN (H): Primary | ICD-10-CM

## 2024-08-22 ENCOUNTER — OFFICE VISIT (OUTPATIENT)
Dept: FAMILY MEDICINE | Facility: CLINIC | Age: 49
End: 2024-08-22
Payer: COMMERCIAL

## 2024-08-22 ENCOUNTER — TELEPHONE (OUTPATIENT)
Dept: FAMILY MEDICINE | Facility: CLINIC | Age: 49
End: 2024-08-22

## 2024-08-22 VITALS
RESPIRATION RATE: 20 BRPM | SYSTOLIC BLOOD PRESSURE: 146 MMHG | OXYGEN SATURATION: 98 % | DIASTOLIC BLOOD PRESSURE: 92 MMHG | TEMPERATURE: 97.8 F | BODY MASS INDEX: 36.3 KG/M2 | WEIGHT: 268 LBS | HEIGHT: 72 IN | HEART RATE: 84 BPM

## 2024-08-22 DIAGNOSIS — E66.812 CLASS 2 SEVERE OBESITY DUE TO EXCESS CALORIES WITH SERIOUS COMORBIDITY AND BODY MASS INDEX (BMI) OF 36.0 TO 36.9 IN ADULT (H): ICD-10-CM

## 2024-08-22 DIAGNOSIS — E11.65 TYPE 2 DIABETES MELLITUS WITH HYPERGLYCEMIA, WITHOUT LONG-TERM CURRENT USE OF INSULIN (H): Primary | ICD-10-CM

## 2024-08-22 DIAGNOSIS — I10 BENIGN ESSENTIAL HYPERTENSION: ICD-10-CM

## 2024-08-22 DIAGNOSIS — E66.01 CLASS 2 SEVERE OBESITY DUE TO EXCESS CALORIES WITH SERIOUS COMORBIDITY AND BODY MASS INDEX (BMI) OF 36.0 TO 36.9 IN ADULT (H): ICD-10-CM

## 2024-08-22 LAB
ANION GAP SERPL CALCULATED.3IONS-SCNC: 10 MMOL/L (ref 7–15)
BUN SERPL-MCNC: 20.4 MG/DL (ref 6–20)
CALCIUM SERPL-MCNC: 9 MG/DL (ref 8.8–10.4)
CHLORIDE SERPL-SCNC: 103 MMOL/L (ref 98–107)
CHOLEST SERPL-MCNC: 156 MG/DL
CREAT SERPL-MCNC: 0.8 MG/DL (ref 0.67–1.17)
CREAT UR-MCNC: 309 MG/DL
EGFRCR SERPLBLD CKD-EPI 2021: >90 ML/MIN/1.73M2
FASTING STATUS PATIENT QL REPORTED: ABNORMAL
FASTING STATUS PATIENT QL REPORTED: ABNORMAL
GLUCOSE SERPL-MCNC: 254 MG/DL (ref 70–99)
HBA1C MFR BLD: 7.7 % (ref 0–5.6)
HCO3 SERPL-SCNC: 26 MMOL/L (ref 22–29)
HDLC SERPL-MCNC: 33 MG/DL
LDLC SERPL CALC-MCNC: 78 MG/DL
MICROALBUMIN UR-MCNC: 16.2 MG/L
MICROALBUMIN/CREAT UR: 5.24 MG/G CR (ref 0–17)
NONHDLC SERPL-MCNC: 123 MG/DL
POTASSIUM SERPL-SCNC: 4.3 MMOL/L (ref 3.4–5.3)
SODIUM SERPL-SCNC: 139 MMOL/L (ref 135–145)
TRIGL SERPL-MCNC: 227 MG/DL

## 2024-08-22 PROCEDURE — 83036 HEMOGLOBIN GLYCOSYLATED A1C: CPT

## 2024-08-22 PROCEDURE — 80048 BASIC METABOLIC PNL TOTAL CA: CPT

## 2024-08-22 PROCEDURE — 36415 COLL VENOUS BLD VENIPUNCTURE: CPT

## 2024-08-22 PROCEDURE — 82570 ASSAY OF URINE CREATININE: CPT

## 2024-08-22 PROCEDURE — 99214 OFFICE O/P EST MOD 30 MIN: CPT | Mod: GC

## 2024-08-22 PROCEDURE — 82043 UR ALBUMIN QUANTITATIVE: CPT

## 2024-08-22 PROCEDURE — 80061 LIPID PANEL: CPT

## 2024-08-22 RX ORDER — LOSARTAN POTASSIUM AND HYDROCHLOROTHIAZIDE 12.5; 1 MG/1; MG/1
1 TABLET ORAL DAILY
Qty: 90 TABLET | Refills: 3 | Status: SHIPPED | OUTPATIENT
Start: 2024-08-22 | End: 2024-09-25

## 2024-08-22 NOTE — PROGRESS NOTES
"  Assessment & Plan   Carlito Dorantes 49-year-old male seen in clinic for follow-up.  Patient was started on medications during last visit due to an A1c of 12, but has not been taking any medications.    Type 2 diabetes mellitus with hyperglycemia, without long-term current use of insulin (H)  Class 2 severe obesity due to excess calories with serious comorbidity and body mass index (BMI) of 36.0 to 36.9 in adult (H)  Previous A1c of 12 10 months ago.  Patient was started on metformin and Rybelsus, but did not take these medications.  Noted \"blurry vision\" from metformin.  Discussed that uncontrolled diabetes could cause blurred vision.  Patient does not want to use insulin.  Discussed medication options today.  Patient agreeable to start Jardiance as well as Ozempic and restart atorvastatin.  Will be rechecking an A1c, BMP, lipid, UA albumin.  Went over how to use Ozempic.  Discussed starting a CGM, patient is interested in starting medication first.  - Hemoglobin A1c; Future  - Lipid panel reflex to direct LDL Non-fasting; Future  - empagliflozin (JARDIANCE) 10 MG TABS tablet; Take 1 tablet (10 mg) by mouth daily.  - Basic metabolic panel; Future  - semaglutide (OZEMPIC) 2 MG/3ML pen; Inject 0.25 mg subcutaneously every 7 days for 4 doses.  - Albumin Random Urine Quantitative with Creat Ratio; Future  - Hemoglobin A1c  - Lipid panel reflex to direct LDL Non-fasting  - Basic metabolic panel  - Albumin Random Urine Quantitative with Creat Ratio  - Atorvastatin 40 mg daily  - Follow-up 1 month    Benign essential hypertension  Blood pressure readings at 146/92 and 147/89 today.  Patient has been on losartan hydrochlorothiazide combo pill in the past as well as amlodipine, has not been taking this recently.  Has not been taking blood pressures at home.  Will be restarting losartan hydrochlorothiazide today.  Recommended patient take blood pressures at home.  Discussed side effects and symptoms of hypotension.  - " losartan-hydrochlorothiazide (HYZAAR) 100-12.5 MG tablet; Take 1 tablet by mouth daily.  - Follow-up 1 month  - Recheck BMP in 1 month    BMI  Estimated body mass index is 36.35 kg/m  as calculated from the following:    Height as of this encounter: 1.829 m (6').    Weight as of this encounter: 121.6 kg (268 lb).   Weight management plan: Discussed healthy diet and exercise guidelines    Mikel Esteban is a 49 year old, presenting for the following health issues:  Leg Problem (Feels tight and has been about a month)      8/22/2024    10:31 AM   Additional Questions   Roomed by Garrett DUQUE   Accompanied by Girlfriend         8/22/2024    Information    services provided? No        HPI   Patient here for complaint of tingling in legs and for follow-up.  Patient is not been seen here for the past 10 months.  Remember being told he had elevated A1c, and was started on some medication.  He started some of the medication he was prescribed at that time but noted blurry vision after starting metformin.  He self discontinued medications.  He has been trying to change his diet.  He does not check his blood sugars or blood pressure at home.  He denies headache, chest pain, current blurry vision.  He notes some tingling in his feet.  Has been having some swelling in his feet that resolves with feet elevation.        Objective    BP (!) 146/92 (BP Location: Right arm, Patient Position: Sitting, Cuff Size: Adult Large)   Pulse 84   Temp 97.8  F (36.6  C) (Oral)   Resp 20   Ht 1.829 m (6')   Wt 121.6 kg (268 lb)   SpO2 98%   BMI 36.35 kg/m    Body mass index is 36.35 kg/m .  Physical Exam   GENERAL: alert and no acute distress  HENT: hearing grossly intact  RESP: no crackles, breathing comfortably  EXTREMITIES: no edema  PSYCH: mentation appears normal, affect normal/bright         Signed Electronically by: Kriss Cabrera MD

## 2024-08-22 NOTE — PROGRESS NOTES
Preceptor Attestation:    I discussed the patient with the resident and evaluated the patient in person. I have verified the content of the note, which accurately reflects my assessment of the patient and the plan of care.   Supervising Physician:  Orlando Esquivel MD.

## 2024-08-23 RX ORDER — ATORVASTATIN CALCIUM 40 MG/1
40 TABLET, FILM COATED ORAL DAILY
Qty: 90 TABLET | Refills: 3 | Status: SHIPPED | OUTPATIENT
Start: 2024-08-23

## 2024-08-31 ENCOUNTER — HEALTH MAINTENANCE LETTER (OUTPATIENT)
Age: 49
End: 2024-08-31

## 2024-09-24 NOTE — PROGRESS NOTES
Assessment & Plan   Carlito Dorantes 49-year-old male seen in clinic for follow-up.  Patient was restarted on medications during last visit due to a previously elevated A1c of 12.     Type 2 diabetes mellitus with hyperglycemia, without long-term current use of insulin (H)  Class 2 severe obesity due to excess calories with serious comorbidity and body mass index (BMI) of 36.0 to 36.9 in adult (H)  Previous A1c of 12 11 months ago. Patient was restarted on medications during last visit as he had not been taking medications since the A1c of 12 was drawn. A repeat A1c of 7.7 was found last visit. Patient was started on Jardiance as well as Ozempic and restarted atorvastatin.  Patient is been tolerating medications well, partner assisting patient in taking medications.  Discussed starting a CGM, patient not interested in checking point-of-care glucoses.   - Continue Atorvastatin 40 mg daily   - Follow-up 1 month  - Basic metabolic panel; Future  - Basic metabolic panel  - semaglutide (OZEMPIC) 2 MG/3ML pen; Inject 0.5 mg subcutaneously every 7 days.  - PharmD appointment to assist with CGM     Benign essential hypertension  Blood pressures improved today of 138/82.  Patient does not take blood pressures at home.  Will be increasing losartan-hydrochlorothiazide to 100-25 from 100-12.5 today.  Patient is not noted any hypotension symptoms at home.  Patient is previously been on amlodipine. Goal <130.  - Basic metabolic panel; Future  - Basic metabolic panel  - losartan-hydrochlorothiazide (HYZAAR) 100-25 MG tablet; Take 1 tablet by mouth daily.    Screen for colon cancer  - Fecal colorectal cancer screen FIT - Future (S+30); Future  - Fecal colorectal cancer screen FIT - Future (S+30)    Encounter for immunization  - TDAP 10-64Y (ADACEL,BOOSTRIX)      Subjective   Carlito is a 49 year old, presenting for the following health issues:  Diabetes, Headache (Have a bad episode of HA last Tuesday, took about  5-10mins for  patient to feel better), and Chest Pain (Have a mild chest pain at least 1-2x a week )    HPI   Patient is presenting today for follow-up for medication management after getting restarted on medications 1 month ago.  No problems taking medications.  Has a pillbox.  Partner helps patient remember medications and gives the Ozempic injection.  Takes Ozempic injection on Mondays.  Missed this last Monday's injection due to uncertainty if they should be increasing amount.  Initial week on Ozempic had some nausea and decreased appetite, but this is resolved by now.  Denies any feelings of hypotension, hypoglycemia.  Does not have a blood pressure machine at home.  Has not been taking blood sugars at home.  Has not noticed any tingling in feet or swelling in feet since restarting medications.  Not noted any side effects except initial nausea from Ozempic.  Wondering if he should increase Ozempic dose.  No additional questions or concerns.      Objective    /82 (BP Location: Left arm, Patient Position: Sitting, Cuff Size: Adult Large)   Pulse 71   Temp 98.1  F (36.7  C) (Oral)   Resp 12   Ht 1.829 m (6')   Wt 122.2 kg (269 lb 6.4 oz)   SpO2 96%   BMI 36.54 kg/m    Body mass index is 36.54 kg/m .  Physical Exam   GENERAL: alert and no acute distress  EYES: eyes grossly normal to inspection, PERRLA, EOM intact  HENT: hearing grossly intact, moist mucus membranes  RESP: lungs clear to auscultation - no rales, rhonchi or wheezes  CV: regular rate and rhythm, normal S1 S2, no murmur appreciated  MS: no gross musculoskeletal defects noted, no edema  PSYCH: mentation appears normal, affect normal/bright     Signed Electronically by: Kriss Cabrera MD

## 2024-09-25 ENCOUNTER — OFFICE VISIT (OUTPATIENT)
Dept: FAMILY MEDICINE | Facility: CLINIC | Age: 49
End: 2024-09-25
Payer: COMMERCIAL

## 2024-09-25 VITALS
SYSTOLIC BLOOD PRESSURE: 138 MMHG | BODY MASS INDEX: 36.49 KG/M2 | HEART RATE: 71 BPM | HEIGHT: 72 IN | OXYGEN SATURATION: 96 % | TEMPERATURE: 98.1 F | WEIGHT: 269.4 LBS | DIASTOLIC BLOOD PRESSURE: 82 MMHG | RESPIRATION RATE: 12 BRPM

## 2024-09-25 DIAGNOSIS — Z12.11 SCREEN FOR COLON CANCER: ICD-10-CM

## 2024-09-25 DIAGNOSIS — I10 BENIGN ESSENTIAL HYPERTENSION: ICD-10-CM

## 2024-09-25 DIAGNOSIS — E66.812 CLASS 2 SEVERE OBESITY DUE TO EXCESS CALORIES WITH SERIOUS COMORBIDITY AND BODY MASS INDEX (BMI) OF 36.0 TO 36.9 IN ADULT (H): ICD-10-CM

## 2024-09-25 DIAGNOSIS — Z23 ENCOUNTER FOR IMMUNIZATION: ICD-10-CM

## 2024-09-25 DIAGNOSIS — E66.01 CLASS 2 SEVERE OBESITY DUE TO EXCESS CALORIES WITH SERIOUS COMORBIDITY AND BODY MASS INDEX (BMI) OF 36.0 TO 36.9 IN ADULT (H): ICD-10-CM

## 2024-09-25 DIAGNOSIS — E11.65 TYPE 2 DIABETES MELLITUS WITH HYPERGLYCEMIA, WITHOUT LONG-TERM CURRENT USE OF INSULIN (H): Primary | ICD-10-CM

## 2024-09-25 LAB
ANION GAP SERPL CALCULATED.3IONS-SCNC: 10 MMOL/L (ref 7–15)
BUN SERPL-MCNC: 17.2 MG/DL (ref 6–20)
CALCIUM SERPL-MCNC: 8.9 MG/DL (ref 8.8–10.4)
CHLORIDE SERPL-SCNC: 101 MMOL/L (ref 98–107)
CREAT SERPL-MCNC: 1.07 MG/DL (ref 0.67–1.17)
EGFRCR SERPLBLD CKD-EPI 2021: 84 ML/MIN/1.73M2
GLUCOSE SERPL-MCNC: 137 MG/DL (ref 70–99)
HCO3 SERPL-SCNC: 27 MMOL/L (ref 22–29)
POTASSIUM SERPL-SCNC: 4.1 MMOL/L (ref 3.4–5.3)
SODIUM SERPL-SCNC: 138 MMOL/L (ref 135–145)

## 2024-09-25 RX ORDER — LOSARTAN POTASSIUM AND HYDROCHLOROTHIAZIDE 25; 100 MG/1; MG/1
1 TABLET ORAL DAILY
Qty: 60 TABLET | Refills: 1 | Status: SHIPPED | OUTPATIENT
Start: 2024-09-25

## 2024-09-25 RX ORDER — BLOOD-GLUCOSE SENSOR
EACH MISCELLANEOUS
Qty: 6 EACH | Refills: 1 | Status: SHIPPED | OUTPATIENT
Start: 2024-09-25

## 2024-09-25 RX ORDER — KETOROLAC TROMETHAMINE 30 MG/ML
1 INJECTION, SOLUTION INTRAMUSCULAR; INTRAVENOUS DAILY
Qty: 1 EACH | Refills: 0 | Status: SHIPPED | OUTPATIENT
Start: 2024-09-25

## 2024-09-25 NOTE — PROGRESS NOTES
Prior to immunization administration, verified patients identity using patient s name and date of birth. Please see Immunization Activity for additional information.     Screening Questionnaire for Adult Immunization    Are you sick today?   No   Do you have allergies to medications, food, a vaccine component or latex?   No   Have you ever had a serious reaction after receiving a vaccination?   No   Do you have a long-term health problem with heart, lung, kidney, or metabolic disease (e.g., diabetes), asthma, a blood disorder, no spleen, complement component deficiency, a cochlear implant, or a spinal fluid leak?  Are you on long-term aspirin therapy?   No   Do you have cancer, leukemia, HIV/AIDS, or any other immune system problem?   No   Do you have a parent, brother, or sister with an immune system problem?   No   In the past 3 months, have you taken medications that affect  your immune system, such as prednisone, other steroids, or anticancer drugs; drugs for the treatment of rheumatoid arthritis, Crohn s disease, or psoriasis; or have you had radiation treatments?   No   Have you had a seizure, or a brain or other nervous system problem?   No   During the past year, have you received a transfusion of blood or blood    products, or been given immune (gamma) globulin or antiviral drug?   No   For women: Are you pregnant or is there a chance you could become       pregnant during the next month?   No   Have you received any vaccinations in the past 4 weeks?   No     Immunization questionnaire answers were all negative.      Patient instructed to remain in clinic for 15 minutes afterwards, and to report any adverse reactions.     Screening performed by Neli Logan CMA on 9/25/2024 at 10:30 AM.

## 2024-09-25 NOTE — PROGRESS NOTES
Preceptor Attestation:    I discussed the patient with the resident and evaluated the patient in person. I have verified the content of the note, which accurately reflects my assessment of the patient and the plan of care.   Supervising Physician:  Jeb Amaya DO.

## 2024-09-25 NOTE — PATIENT INSTRUCTIONS
Increase your ozempic from 0.25 to 0.5 weekly!  Continue taking the jardiance (Diabetes medication), atorvastatin (cholesterol medicine)  We are increasing your losartan-hydrochlorothiazide (blood pressure medication) to 100-25  I will look into the continuous glucose monitoring for you.  We will help you schedule pharmacy appointment!  I will see you in one month

## 2024-10-04 LAB — HEMOCCULT STL QL IA: NEGATIVE

## 2024-11-26 DIAGNOSIS — E11.65 TYPE 2 DIABETES MELLITUS WITH HYPERGLYCEMIA, WITHOUT LONG-TERM CURRENT USE OF INSULIN (H): ICD-10-CM

## 2024-12-05 ENCOUNTER — TELEPHONE (OUTPATIENT)
Dept: FAMILY MEDICINE | Facility: CLINIC | Age: 49
End: 2024-12-05
Payer: COMMERCIAL

## 2024-12-05 NOTE — TELEPHONE ENCOUNTER
Per Dr. Cabrera's message, attempt make to reach patient. As this writer self introducing and ask for patient, person who  phone hand up call.     Neli Logan, CMA

## 2025-01-21 DIAGNOSIS — I10 BENIGN ESSENTIAL HYPERTENSION: ICD-10-CM

## 2025-01-21 RX ORDER — LOSARTAN POTASSIUM AND HYDROCHLOROTHIAZIDE 25; 100 MG/1; MG/1
1 TABLET ORAL DAILY
Qty: 60 TABLET | Refills: 1 | Status: SHIPPED | OUTPATIENT
Start: 2025-01-21

## 2025-01-21 NOTE — TELEPHONE ENCOUNTER
Name from pharmacy: LOSARTAN/HCTZ 100/25MG TABLETS          Will file in chart as: losartan-hydrochlorothiazide (HYZAAR) 100-25 MG tablet    Sig: Take 1 tablet by mouth daily.    Original sig: TAKE 1 TABLET BY MOUTH DAILY    Disp: 60 tablet    Refills: 1 (Pharmacy requested: Not specified)    Start: 1/21/2025    Class: E-Prescribe    Non-formulary For: Benign essential hypertension    Last ordered: 3 months ago (9/25/2024) by Jeb Amaya DO    Last refill: 11/21/2024    Rx #: [id number]    Angiotensin-II Receptors [id number]/21/2025 04:03 AM   Protocol Details Most recent blood pressure under 140/90 in past 12 months    Medication is active on med list    Has GFR on file in past 12 months and most recent value is normal    Medication indicated for associated diagnosis    Recent (12 mo) or future (90days) visit within the authorizing provider's specialty    Patient is age 18 or older    Normal serum potassium on file in past 12 months   Diuretics (Including Combos) Protocol Passed   Protocol Details Most recent blood pressure under 140/90 in past 12 months    Potassium level on file in past 12 months    Medication is active on med list    Medication indicated for associated diagnosis    Has GFR on file in past 12 months and most recent value is normal    Recent (12 mo) or future (90 days) visit within the authorizing provider's specialty    Patient is age 18 or older        BP Readings from Last 3 Encounters:   09/25/24 138/82   08/22/24 (!) 146/92   10/23/23 131/87     Potassium   Date Value Ref Range Status   09/25/2024 4.1 3.4 - 5.3 mmol/L Final   02/04/2022 4.3 3.5 - 5.0 mmol/L Final   09/20/2018 4.1 3.2 - 4.6 mmol/dL Final     GFR Estimate   Date Value Ref Range Status   09/25/2024 84 >60 mL/min/1.73m2 Final     Comment:     eGFR calculated using 2021 CKD-EPI equation.   09/20/2018 >90 >60.0 mL/min/1.7 m2 Final     Prescription approved per [facility] Refill Protocol.  INGA Dong, BSN

## 2025-01-23 DIAGNOSIS — E11.65 TYPE 2 DIABETES MELLITUS WITH HYPERGLYCEMIA, WITHOUT LONG-TERM CURRENT USE OF INSULIN (H): ICD-10-CM

## 2025-01-23 RX ORDER — SEMAGLUTIDE 0.68 MG/ML
INJECTION, SOLUTION SUBCUTANEOUS
Qty: 3 ML | Refills: 0 | Status: SHIPPED | OUTPATIENT
Start: 2025-01-23

## 2025-01-23 NOTE — TELEPHONE ENCOUNTER
Name from pharmacy: OZEMPIC 0.25 OR 0.5MG DOS(2MG/3ML)         Will file in chart as: OZEMPIC, 0.25 OR 0.5 MG/DOSE, 2 MG/3ML pen    Sig: INJECT 0.5 MG UNDER THE SKIN EVERY 7 DAYS    Disp: 3 mL    Refills: 0 (Pharmacy requested: Not specified)    Start: 1/23/2025    Class: E-Prescribe    Non-formulary For: Type 2 diabetes mellitus with hyperglycemia, without long-term current use of insulin (H)    Last ordered: 1 month ago (11/26/2024) by Navya Camacho MD    Last refill: 11/26/2024    Rx #: [id number]    GLP-1 Agonists Protocol [id number]/23/2025 12:47 PM   Protocol Details HgbA1C in past 3 or 6 months    Medication is active on med list    Has GFR on file in past 12 months and most recent value is normal    Recent (6 mo) or future (90 days) visit within the authorizing provider's specialty    Medication indicated for associated diagnosis    Patient is age 18 or older        Lab Results   Component Value Date    A1C 7.7 08/22/2024    A1C 12.0 10/02/2023    A1C 6.0 02/04/2022    A1C 5.1 09/20/2018    A1C 4.7 11/27/2013     GFR Estimate   Date Value Ref Range Status   09/25/2024 84 >60 mL/min/1.73m2 Final     Comment:     eGFR calculated using 2021 CKD-EPI equation.   09/20/2018 >90 >60.0 mL/min/1.7 m2 Final       Prescription approved per [facility] Refill Protocol.    Sloan Egan, RN, MSN

## 2025-03-02 ENCOUNTER — HEALTH MAINTENANCE LETTER (OUTPATIENT)
Age: 50
End: 2025-03-02